# Patient Record
Sex: FEMALE | Race: WHITE | NOT HISPANIC OR LATINO | URBAN - METROPOLITAN AREA
[De-identification: names, ages, dates, MRNs, and addresses within clinical notes are randomized per-mention and may not be internally consistent; named-entity substitution may affect disease eponyms.]

---

## 2019-10-17 ENCOUNTER — APPOINTMENT (RX ONLY)
Dept: URBAN - METROPOLITAN AREA OTHER 9 | Facility: OTHER | Age: 39
Setting detail: DERMATOLOGY
End: 2019-10-17

## 2019-10-17 DIAGNOSIS — L81.4 OTHER MELANIN HYPERPIGMENTATION: ICD-10-CM

## 2019-10-17 DIAGNOSIS — L57.8 OTHER SKIN CHANGES DUE TO CHRONIC EXPOSURE TO NONIONIZING RADIATION: ICD-10-CM

## 2019-10-17 DIAGNOSIS — D22 MELANOCYTIC NEVI: ICD-10-CM

## 2019-10-17 DIAGNOSIS — Q826 OTHER SPECIFIED ANOMALIES OF SKIN: ICD-10-CM

## 2019-10-17 DIAGNOSIS — Q819 OTHER SPECIFIED ANOMALIES OF SKIN: ICD-10-CM

## 2019-10-17 DIAGNOSIS — L98.8 OTHER SPECIFIED DISORDERS OF THE SKIN AND SUBCUTANEOUS TISSUE: ICD-10-CM

## 2019-10-17 DIAGNOSIS — Q828 OTHER SPECIFIED ANOMALIES OF SKIN: ICD-10-CM

## 2019-10-17 PROBLEM — D48.5 NEOPLASM OF UNCERTAIN BEHAVIOR OF SKIN: Status: ACTIVE | Noted: 2019-10-17

## 2019-10-17 PROBLEM — Q82.8 OTHER SPECIFIED CONGENITAL MALFORMATIONS OF SKIN: Status: ACTIVE | Noted: 2019-10-17

## 2019-10-17 PROBLEM — D22.5 MELANOCYTIC NEVI OF TRUNK: Status: ACTIVE | Noted: 2019-10-17

## 2019-10-17 PROCEDURE — ? PRESCRIPTION

## 2019-10-17 PROCEDURE — ? COUNSELING

## 2019-10-17 PROCEDURE — ? OBSERVATION

## 2019-10-17 PROCEDURE — ? OTHER

## 2019-10-17 PROCEDURE — ? TREATMENT REGIMEN

## 2019-10-17 PROCEDURE — 99203 OFFICE O/P NEW LOW 30 MIN: CPT

## 2019-10-17 RX ORDER — TRETINOIN 0.25 MG/G
CREAM TOPICAL
Qty: 1 | Refills: 3 | Status: ERX | COMMUNITY
Start: 2019-10-17

## 2019-10-17 RX ORDER — TRIAMCINOLONE ACETONIDE 1 MG/G
CREAM TOPICAL BID X 2 WEEKS
Qty: 1 | Refills: 1 | Status: ERX | COMMUNITY
Start: 2019-10-17

## 2019-10-17 RX ADMIN — TRIAMCINOLONE ACETONIDE: 1 CREAM TOPICAL at 00:00

## 2019-10-17 RX ADMIN — TRETINOIN: 0.25 CREAM TOPICAL at 00:00

## 2019-10-17 ASSESSMENT — LOCATION ZONE DERM
LOCATION ZONE: LEG
LOCATION ZONE: TRUNK
LOCATION ZONE: SCALP
LOCATION ZONE: FACE
LOCATION ZONE: ARM

## 2019-10-17 ASSESSMENT — LOCATION DETAILED DESCRIPTION DERM
LOCATION DETAILED: SUPERIOR THORACIC SPINE
LOCATION DETAILED: INFERIOR MID FOREHEAD
LOCATION DETAILED: LEFT SUPERIOR PARIETAL SCALP
LOCATION DETAILED: LEFT SUPRAPUBIC SKIN
LOCATION DETAILED: RIGHT ANTERIOR PROXIMAL THIGH
LOCATION DETAILED: RIGHT PROXIMAL POSTERIOR UPPER ARM
LOCATION DETAILED: UPPER STERNUM
LOCATION DETAILED: RIGHT LATERAL KNEE
LOCATION DETAILED: LEFT MID-UPPER BACK

## 2019-10-17 ASSESSMENT — LOCATION SIMPLE DESCRIPTION DERM
LOCATION SIMPLE: LEFT UPPER BACK
LOCATION SIMPLE: INFERIOR FOREHEAD
LOCATION SIMPLE: GROIN
LOCATION SIMPLE: SCALP
LOCATION SIMPLE: RIGHT THIGH
LOCATION SIMPLE: UPPER BACK
LOCATION SIMPLE: RIGHT KNEE
LOCATION SIMPLE: CHEST
LOCATION SIMPLE: RIGHT POSTERIOR UPPER ARM

## 2019-10-17 NOTE — PROCEDURE: OBSERVATION
Size Of Lesion In Cm (Optional): 0
Body Location Override (Optional - Billing Will Still Be Based On Selected Body Map Location If Applicable): left upper back
Detail Level: Zone

## 2019-10-17 NOTE — PROCEDURE: OTHER
Other (Free Text): Biopsy Shave Method Location (A): left parietal scalp. Written consent was obtained and risks were reviewed including but not limited to scarring, infection, bleeding, scabbing, incomplete removal, nerve damage and allergy to anesthesia.  The area was prepped with Alcohol. Local anesthesia was obtained with approximately 3cc of 1% lidocaine with 1:200,000 epinephrine.  A biopsy by shave method to the level of the dermis (sent for H and E) was performed using a Personna blade on the left parietal scalp. Aluminum Chloride and Thermocautery was used for hemostasis. Following the biopsy Polysporin ointment and a bandage were applied. Patient will be notified of biopsy results. However, patient instructed to call the office if not contacted within 2 weeks. I reviewed with the patient in detail post-care instructions. Patient is to keep the biopsy site dry overnight, and then apply bacitracin twice daily until healed. Patient may apply hydrogen peroxide soaks to remove any crusting.
Note Text (......Xxx Chief Complaint.): This diagnosis correlates with the
Detail Level: Detailed

## 2019-12-09 ENCOUNTER — APPOINTMENT (RX ONLY)
Dept: URBAN - METROPOLITAN AREA OTHER 9 | Facility: OTHER | Age: 39
Setting detail: DERMATOLOGY
End: 2019-12-09

## 2019-12-09 ENCOUNTER — RX ONLY (OUTPATIENT)
Age: 39
Setting detail: RX ONLY
End: 2019-12-09

## 2019-12-09 DIAGNOSIS — L57.8 OTHER SKIN CHANGES DUE TO CHRONIC EXPOSURE TO NONIONIZING RADIATION: ICD-10-CM | Status: IMPROVED

## 2019-12-09 DIAGNOSIS — Q819 OTHER SPECIFIED ANOMALIES OF SKIN: ICD-10-CM | Status: IMPROVED

## 2019-12-09 DIAGNOSIS — Q828 OTHER SPECIFIED ANOMALIES OF SKIN: ICD-10-CM | Status: IMPROVED

## 2019-12-09 DIAGNOSIS — B35.6 TINEA CRURIS: ICD-10-CM

## 2019-12-09 DIAGNOSIS — Q826 OTHER SPECIFIED ANOMALIES OF SKIN: ICD-10-CM | Status: IMPROVED

## 2019-12-09 PROBLEM — Q82.8 OTHER SPECIFIED CONGENITAL MALFORMATIONS OF SKIN: Status: ACTIVE | Noted: 2019-12-09

## 2019-12-09 PROCEDURE — ? PRESCRIPTION

## 2019-12-09 PROCEDURE — ? OTHER

## 2019-12-09 PROCEDURE — ? TREATMENT REGIMEN

## 2019-12-09 PROCEDURE — ? COUNSELING

## 2019-12-09 PROCEDURE — 99213 OFFICE O/P EST LOW 20 MIN: CPT

## 2019-12-09 RX ORDER — ECONAZOLE NITRATE 10 MG/G
CREAM TOPICAL BID
Qty: 1 | Refills: 1 | Status: ERX | COMMUNITY
Start: 2019-12-09

## 2019-12-09 RX ORDER — TRIAMCINOLONE ACETONIDE 1 MG/G
CREAM TOPICAL BID X 2 WEEKS
Qty: 1 | Refills: 1 | Status: ERX

## 2019-12-09 RX ADMIN — ECONAZOLE NITRATE: 10 CREAM TOPICAL at 00:00

## 2019-12-09 ASSESSMENT — LOCATION ZONE DERM
LOCATION ZONE: FACE
LOCATION ZONE: VULVA
LOCATION ZONE: ARM
LOCATION ZONE: LEG

## 2019-12-09 ASSESSMENT — LOCATION DETAILED DESCRIPTION DERM
LOCATION DETAILED: RIGHT PROXIMAL POSTERIOR UPPER ARM
LOCATION DETAILED: RIGHT LATERAL KNEE
LOCATION DETAILED: MONS PUBIS
LOCATION DETAILED: INFERIOR MID FOREHEAD

## 2019-12-09 ASSESSMENT — LOCATION SIMPLE DESCRIPTION DERM
LOCATION SIMPLE: INFERIOR FOREHEAD
LOCATION SIMPLE: RIGHT KNEE
LOCATION SIMPLE: GROIN
LOCATION SIMPLE: RIGHT POSTERIOR UPPER ARM

## 2019-12-09 ASSESSMENT — SEVERITY ASSESSMENT
SEVERITY: ALMOST CLEAR
SEVERITY: MILD

## 2019-12-09 NOTE — PROCEDURE: TREATMENT REGIMEN
Continue Regimen: Retin-A/tretinoin
Detail Level: Zone
Plan for chemical peel during the winter
Detail Level: Simple
Continue Regimen: Triamcinolone (refilled)

## 2019-12-09 NOTE — PROCEDURE: OTHER
Other (Free Text): Dalton’s and sal acid chemical peel at today’s visit
Detail Level: Simple
Note Text (......Xxx Chief Complaint.): This diagnosis correlates with the

## 2022-07-07 ENCOUNTER — APPOINTMENT (RX ONLY)
Dept: URBAN - METROPOLITAN AREA OTHER 9 | Facility: OTHER | Age: 42
Setting detail: DERMATOLOGY
End: 2022-07-07

## 2022-07-07 DIAGNOSIS — D22 MELANOCYTIC NEVI: ICD-10-CM

## 2022-07-07 DIAGNOSIS — L57.8 OTHER SKIN CHANGES DUE TO CHRONIC EXPOSURE TO NONIONIZING RADIATION: ICD-10-CM

## 2022-07-07 PROBLEM — D48.5 NEOPLASM OF UNCERTAIN BEHAVIOR OF SKIN: Status: ACTIVE | Noted: 2022-07-07

## 2022-07-07 PROCEDURE — ? COUNSELING

## 2022-07-07 PROCEDURE — ? PRESCRIPTION

## 2022-07-07 PROCEDURE — ? PATIENT SPECIFIC COUNSELING

## 2022-07-07 PROCEDURE — ? OTHER

## 2022-07-07 PROCEDURE — ? BIOPSY BY SHAVE METHOD

## 2022-07-07 PROCEDURE — 99212 OFFICE O/P EST SF 10 MIN: CPT | Mod: 25

## 2022-07-07 PROCEDURE — ? PRESCRIPTION MEDICATION MANAGEMENT

## 2022-07-07 RX ORDER — TRETIONIN 0.25 MG/G
CREAM TOPICAL QHS
Qty: 45 | Refills: 3 | Status: ERX

## 2022-07-07 ASSESSMENT — LOCATION SIMPLE DESCRIPTION DERM
LOCATION SIMPLE: ABDOMEN
LOCATION SIMPLE: INFERIOR FOREHEAD
LOCATION SIMPLE: LEFT UPPER BACK

## 2022-07-07 ASSESSMENT — LOCATION DETAILED DESCRIPTION DERM
LOCATION DETAILED: INFERIOR MID FOREHEAD
LOCATION DETAILED: RIGHT RIB CAGE
LOCATION DETAILED: LEFT MID-UPPER BACK

## 2022-07-07 ASSESSMENT — LOCATION ZONE DERM
LOCATION ZONE: FACE
LOCATION ZONE: TRUNK

## 2022-07-07 NOTE — PROCEDURE: PATIENT SPECIFIC COUNSELING
Advised patient to limit exercise for a few days following peel, as well as avoiding sun exposure for 2 weeks; and to wear sunscreen. Also informed the patient to avoid picking at face. Recommended Revisions vitamin C serum
Detail Level: Detailed

## 2022-07-07 NOTE — PROCEDURE: PRESCRIPTION MEDICATION MANAGEMENT
Continue Regimen: tretinoin 0.025 % topical cream QHS: Apply pea-size amount to affected areas every other night. Avoid eyes and lips.
Render In Strict Bullet Format?: No
Detail Level: Zone

## 2022-07-07 NOTE — PROCEDURE: OTHER
Detail Level: Detailed
Other (Free Text): A chemical peel with Jessner was performed on the body. Prior to the procedure, written consent was obtained and risks were reviewed, including but not limited to: redness, peeling, blistering, pigmentary change, scarring, infection, and pain.  The treated area was degreased with pre-peel cleanser, and vaseline was applied for protection of mucous membranes.\\nAfter the procedure, a post-peel cream was applied to the treated areas. Sun protection and post-care instructions were reviewed with the patient..
Note Text (......Xxx Chief Complaint.): This diagnosis correlates with the
Render Risk Assessment In Note?: no

## 2022-07-07 NOTE — PROCEDURE: BIOPSY BY SHAVE METHOD
Detail Level: Detailed
Depth Of Biopsy: dermis
Was A Bandage Applied: Yes
Size Of Lesion In Cm: 0.5
Biopsy Type: H and E
Biopsy Method: Personna blade
Anesthesia Type: 1% lidocaine with 1:200,000 epinephrine
Anesthesia Volume In Cc: 3
Additional Anesthesia Volume In Cc (Will Not Render If 0): 0
Hemostasis: Aluminum Chloride and Thermocautery
Wound Care: Polysporin ointment
Dressing: bandage
Destruction After The Procedure: No
Type Of Destruction Used: Curettage
Curettage Text: The wound bed was treated with curettage after the biopsy was performed
Cryotherapy Text: The wound bed was treated with cryotherapy after the biopsy was performed.
Electrodesiccation Text: The wound bed was treated with electrodesiccation after the biopsy was performed.
Electrodesiccation And Curettage Text: The wound bed was treated with electrodesiccation and curettage after the biopsy was performed.
Silver Nitrate Text: The wound bed was treated with silver nitrate after the biopsy was performed.
Lab: [id number]
Lab Facility: [id number]
Consent: Written consent was obtained and risks were reviewed including but not limited to scarring, infection, bleeding, scabbing, incomplete removal, nerve damage and allergy to anesthesia.
Post-Care Instructions: I reviewed with the patient in detail post-care instructions. Patient is to keep the biopsy site dry overnight, and then apply bacitracin twice daily until healed. Patient may apply hydrogen peroxide soaks to remove any crusting.
Notification Instructions: Patient will be notified of biopsy results. However, patient instructed to call the office if not contacted within 2 weeks.
Billing Type: Third-Party Bill
Information: Selecting Yes will display possible errors in your note based on the variables you have selected. This validation is only offered as a suggestion for you. PLEASE NOTE THAT THE VALIDATION TEXT WILL BE REMOVED WHEN YOU FINALIZE YOUR NOTE. IF YOU WANT TO FAX A PRELIMINARY NOTE YOU WILL NEED TO TOGGLE THIS TO 'NO' IF YOU DO NOT WANT IT IN YOUR FAXED NOTE.

## 2022-07-07 NOTE — HPI: COSMETIC (CHEMICAL PEEL)
Have You Had A Chemical Peel Before?: has not had a previous peel
Additional History: Patient is also present for removal of IDN on back and abdomen.

## 2023-02-02 ENCOUNTER — APPOINTMENT (RX ONLY)
Dept: URBAN - METROPOLITAN AREA CLINIC 12 | Facility: CLINIC | Age: 43
Setting detail: DERMATOLOGY
End: 2023-02-02

## 2023-02-02 DIAGNOSIS — Z41.9 ENCOUNTER FOR PROCEDURE FOR PURPOSES OTHER THAN REMEDYING HEALTH STATE, UNSPECIFIED: ICD-10-CM

## 2023-02-02 PROCEDURE — ? ADDITIONAL NOTES

## 2023-02-02 PROCEDURE — ? OTHER (COSMETIC)

## 2023-02-02 PROCEDURE — ? CONSULTATION - AGING FACE

## 2023-02-02 NOTE — PROCEDURE: OTHER (COSMETIC)
Sticky Other (Free Text): Juvederm Volux XC 1cc\\n2 syringes used\\nCheeks: 1cc \\nJawline: 0.6cc\\nChin: 0.4cc\\nLot: [id number]\\nExp: 8/24\\nBarcode: [id number] x2
Price $ (Use Numbers Only, No Text Please.): 3760
Detail Level: Zone

## 2023-02-02 NOTE — PROCEDURE: ADDITIONAL NOTES
Render Risk Assessment In Note?: no
Additional Notes: Recommended Alastin Post injection cream inhance - no charge per Yarelis cevallos

## 2023-02-09 ENCOUNTER — APPOINTMENT (RX ONLY)
Dept: URBAN - METROPOLITAN AREA CLINIC 12 | Facility: CLINIC | Age: 43
Setting detail: DERMATOLOGY
End: 2023-02-09

## 2023-02-09 DIAGNOSIS — Z428 OTHER AFTERCARE INVOLVING THE USE OF PLASTIC SURGERY: ICD-10-CM

## 2023-02-09 PROCEDURE — ? CONSULTATION - FILLERS

## 2023-02-09 PROCEDURE — ? PHOTOS OBTAINED

## 2023-02-09 PROCEDURE — ? ADDITIONAL NOTES

## 2023-02-09 NOTE — PROCEDURE: PHOTOS OBTAINED
Photographed Locations: Photos were obtained of the patient.
Follow-Up For Additional Photos?: no
Detail Level: Detailed

## 2023-02-09 NOTE — PROCEDURE: CONSULTATION - FILLERS
Decollete Secondary Filler Volume In Cc (Estimated): 0
Additional Area 1 Location: chin
Procedure Quote $ (Will Render In Note. Use Numbers Only, No Text Please.): 950
Additional Area 5 Location: neck
Temples Filler (Primary): Radiesse+
Send Procedure Quote As Charge: No
Detail Level: Detailed
Temples Primary Filler Volume In Cc (Estimated): 1.5
Additional Area 4 Location: nose
Additional Area 3 Location: perioral lines
Additional Area 2 Location: chin shadow

## 2023-02-09 NOTE — PROCEDURE: ADDITIONAL NOTES
Additional Notes: Informed patient it is too early for true follow up, advised it will continue to settle over the next 2-3 weeks.
Render Risk Assessment In Note?: no
Additional Notes: Discussed kybella for jowls and buccal fat area.

## 2023-03-01 ENCOUNTER — APPOINTMENT (RX ONLY)
Dept: URBAN - METROPOLITAN AREA CLINIC 12 | Facility: CLINIC | Age: 43
Setting detail: DERMATOLOGY
End: 2023-03-01

## 2023-03-01 DIAGNOSIS — Z428 OTHER AFTERCARE INVOLVING THE USE OF PLASTIC SURGERY: ICD-10-CM

## 2023-03-01 PROCEDURE — ? CODE 99024 - NO CHARGE POST-OP VISIT

## 2023-03-01 PROCEDURE — 99024 POSTOP FOLLOW-UP VISIT: CPT

## 2023-03-01 PROCEDURE — ? CONSULTATION - FILLERS

## 2023-03-01 PROCEDURE — ? PHOTOS OBTAINED

## 2023-03-01 NOTE — PROCEDURE: REASSURANCE
Detail Level: Detailed
Information Provided: Reassurance was provided to the patient that the clinical findings are within expected normal limits and do not require specific further evaluation or treatment at this time.
Follow-Up Increment: 3
Follow-Up Interval: months
Follow-Up: no

## 2023-03-01 NOTE — PROCEDURE: PHOTOS OBTAINED
Follow-Up For Additional Photos?: no
Detail Level: Detailed
Photographed Locations: Photos were obtained of the patient.

## 2023-03-01 NOTE — PROCEDURE: CONSULTATION - FILLERS
Additional Area 3 Location: perioral lines
Brows Filler Secondary Volume In Cc (Estimated): 0
Procedure Quote $ (Will Render In Note. Use Numbers Only, No Text Please.): 1800
Send Procedure Quote As Charge: No
Additional Area 2 Location: chin shadow
Detail Level: Detailed
Temples Filler (Primary): Radiesse+
Additional Area 1 Location: chin
Additional Area 5 Location: neck
Additional Area 4 Location: nose
Temples Primary Filler Volume In Cc (Estimated): 3

## 2023-06-07 ENCOUNTER — APPOINTMENT (RX ONLY)
Dept: URBAN - METROPOLITAN AREA CLINIC 12 | Facility: CLINIC | Age: 43
Setting detail: DERMATOLOGY
End: 2023-06-07

## 2023-06-07 DIAGNOSIS — Z41.9 ENCOUNTER FOR PROCEDURE FOR PURPOSES OTHER THAN REMEDYING HEALTH STATE, UNSPECIFIED: ICD-10-CM

## 2023-06-07 DIAGNOSIS — Z428 OTHER AFTERCARE INVOLVING THE USE OF PLASTIC SURGERY: ICD-10-CM

## 2023-06-07 PROCEDURE — ? CODE 99024 - NO CHARGE POST-OP VISIT

## 2023-06-07 PROCEDURE — 99024 POSTOP FOLLOW-UP VISIT: CPT

## 2023-06-07 ASSESSMENT — LOCATION DETAILED DESCRIPTION DERM: LOCATION DETAILED: LEFT CENTRAL MALAR CHEEK

## 2023-06-07 ASSESSMENT — LOCATION ZONE DERM: LOCATION ZONE: FACE

## 2023-06-07 ASSESSMENT — LOCATION SIMPLE DESCRIPTION DERM: LOCATION SIMPLE: LEFT CHEEK

## 2023-06-07 NOTE — PROCEDURE: REASSURANCE
Detail Level: Detailed
Follow-Up Interval: months
Follow-Up: no
Follow-Up Increment: 3
Information Provided: Reassurance was provided to the patient that the clinical findings are within expected normal limits and do not require specific further evaluation or treatment at this time.

## 2023-08-16 ENCOUNTER — APPOINTMENT (RX ONLY)
Dept: URBAN - METROPOLITAN AREA OTHER 9 | Facility: OTHER | Age: 43
Setting detail: DERMATOLOGY
End: 2023-08-16

## 2023-08-16 DIAGNOSIS — Q819 OTHER SPECIFIED ANOMALIES OF SKIN: ICD-10-CM

## 2023-08-16 DIAGNOSIS — L57.8 OTHER SKIN CHANGES DUE TO CHRONIC EXPOSURE TO NONIONIZING RADIATION: ICD-10-CM

## 2023-08-16 DIAGNOSIS — D22 MELANOCYTIC NEVI: ICD-10-CM

## 2023-08-16 DIAGNOSIS — L81.4 OTHER MELANIN HYPERPIGMENTATION: ICD-10-CM

## 2023-08-16 DIAGNOSIS — Q826 OTHER SPECIFIED ANOMALIES OF SKIN: ICD-10-CM

## 2023-08-16 DIAGNOSIS — Q828 OTHER SPECIFIED ANOMALIES OF SKIN: ICD-10-CM

## 2023-08-16 PROBLEM — D22.5 MELANOCYTIC NEVI OF TRUNK: Status: ACTIVE | Noted: 2023-08-16

## 2023-08-16 PROBLEM — L85.8 OTHER SPECIFIED EPIDERMAL THICKENING: Status: ACTIVE | Noted: 2023-08-16

## 2023-08-16 PROCEDURE — ? TREATMENT REGIMEN

## 2023-08-16 PROCEDURE — ? COUNSELING

## 2023-08-16 PROCEDURE — ? PRESCRIPTION MEDICATION MANAGEMENT

## 2023-08-16 PROCEDURE — 99213 OFFICE O/P EST LOW 20 MIN: CPT

## 2023-08-16 ASSESSMENT — LOCATION ZONE DERM
LOCATION ZONE: LEG
LOCATION ZONE: FACE
LOCATION ZONE: TRUNK

## 2023-08-16 ASSESSMENT — LOCATION DETAILED DESCRIPTION DERM
LOCATION DETAILED: LEFT SUPERIOR UPPER BACK
LOCATION DETAILED: RIGHT PROXIMAL PRETIBIAL REGION
LOCATION DETAILED: LEFT CENTRAL MALAR CHEEK

## 2023-08-16 ASSESSMENT — LOCATION SIMPLE DESCRIPTION DERM
LOCATION SIMPLE: LEFT UPPER BACK
LOCATION SIMPLE: LEFT CHEEK
LOCATION SIMPLE: RIGHT PRETIBIAL REGION

## 2023-08-16 NOTE — PROCEDURE: PRESCRIPTION MEDICATION MANAGEMENT
Continue Regimen: Continue using tretinoin 3x a week or everyday
Detail Level: Detailed
Render In Strict Bullet Format?: No

## 2023-08-16 NOTE — PROCEDURE: TREATMENT REGIMEN
Plan: Recommend exfoliation, laser hair removal or treatment of KP on legs
Detail Level: Zone
Otc Regimen: Glytone

## 2024-07-20 ENCOUNTER — OFFICE VISIT (OUTPATIENT)
Dept: URGENT CARE | Facility: CLINIC | Age: 44
End: 2024-07-20
Payer: COMMERCIAL

## 2024-07-20 VITALS
SYSTOLIC BLOOD PRESSURE: 104 MMHG | WEIGHT: 120 LBS | HEART RATE: 77 BPM | OXYGEN SATURATION: 98 % | RESPIRATION RATE: 15 BRPM | DIASTOLIC BLOOD PRESSURE: 73 MMHG | TEMPERATURE: 98 F | HEIGHT: 65 IN | BODY MASS INDEX: 19.99 KG/M2

## 2024-07-20 DIAGNOSIS — S61.439A: ICD-10-CM

## 2024-07-20 DIAGNOSIS — Z23 NEED FOR TDAP VACCINATION: ICD-10-CM

## 2024-07-20 DIAGNOSIS — M79.641 RIGHT HAND PAIN: ICD-10-CM

## 2024-07-20 DIAGNOSIS — S61.431A PUNCTURE WOUND OF RIGHT HAND WITHOUT FOREIGN BODY, INITIAL ENCOUNTER: Primary | ICD-10-CM
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
## 2024-07-20 PROBLEM — L90.8 SKIN AGING: Status: ACTIVE | Noted: 2024-07-20

## 2024-07-20 PROCEDURE — 90715 TDAP VACCINE 7 YRS/> IM: CPT | Mod: S$GLB,,, | Performed by: NURSE PRACTITIONER

## 2024-07-20 PROCEDURE — 90471 IMMUNIZATION ADMIN: CPT | Mod: S$GLB,,, | Performed by: NURSE PRACTITIONER

## 2024-07-20 PROCEDURE — 99203 OFFICE O/P NEW LOW 30 MIN: CPT | Mod: 25,S$GLB,, | Performed by: NURSE PRACTITIONER

## 2024-07-20 RX ORDER — CEPHALEXIN 500 MG/1
500 CAPSULE ORAL EVERY 6 HOURS
Qty: 20 CAPSULE | Refills: 0 | Status: SHIPPED | OUTPATIENT
Start: 2024-07-20 | End: 2024-07-25

## 2024-07-20 RX ORDER — MUPIROCIN 20 MG/G
OINTMENT TOPICAL 3 TIMES DAILY PRN
Qty: 22 G | Refills: 0 | Status: SHIPPED | OUTPATIENT
Start: 2024-07-20

## 2024-07-20 RX ORDER — ACETAMINOPHEN 500 MG
500 TABLET ORAL
Status: COMPLETED | OUTPATIENT
Start: 2024-07-20 | End: 2024-07-20

## 2024-07-20 RX ADMIN — Medication 500 MG: at 01:07

## 2024-07-20 NOTE — PATIENT INSTRUCTIONS
Please drink plenty of fluids.  Please get plenty of rest.  Please return here or go to the Emergency Department for any concerns or worsening of condition.  If you were given wait & see antibiotics, please wait  before taking them, and only take them if your symptoms have worsened or not improved.  If you do begin taking the antibiotics, please take them to completion.  If not allergic, please take over the counter Tylenol (Acetaminophen) and/or Motrin (Ibuprofen) as directed for control of pain and/or fever.  Please follow up with your primary care doctor or specialist as needed.    If you  smoke, please stop smoking.

## 2024-07-20 NOTE — PROGRESS NOTES
"Subjective:      Patient ID: Esdras Solorio is a 43 y.o. female.    Vitals:  height is 5' 5" (1.651 m) and weight is 54.4 kg (120 lb). Her oral temperature is 98.3 °F (36.8 °C). Her blood pressure is 104/73 and her pulse is 77. Her respiration is 15 and oxygen saturation is 98%.     Chief Complaint: Puncture wound    Patient presents with c.o puncture wound to the rt hand that occurred appx 1 hour pta. Patient states she was moving an old couch and a nailed punctured the hand. Wound cleaned with soap and water plus vodka. Last tetanus unknown. Patient also reports pain that radiates up her forearm.       43 year old female presents to clinic with complaints of puncture injury with nail to palmar aspect of left hand. Area cleaned with vodka, soap and water. Reports tingling sensation going up her arm. Does not recall last tetanus vaccine    Other  This is a new problem. Episode onset: 1 hour pta. Episode frequency: once. Pertinent negatives include no abdominal pain, anorexia or numbness. Nothing aggravates the symptoms. She has tried nothing for the symptoms.     Gastrointestinal:  Negative for abdominal pain.   Skin:  Positive for puncture wound. Negative for color change and erythema.   Neurological:  Positive for tingling. Negative for numbness.      Objective:     Physical Exam   Constitutional: She is oriented to person, place, and time. She appears well-developed.   HENT:   Head: Normocephalic and atraumatic. Head is without abrasion, without contusion and without laceration.   Ears:   Right Ear: External ear normal.   Left Ear: External ear normal.   Nose: Nose normal.   Mouth/Throat: Oropharynx is clear and moist and mucous membranes are normal.   Eyes: EOM and lids are normal. Extraocular movement intact   Neck: Trachea normal and phonation normal. Neck supple.   Cardiovascular: Normal rate.   Pulmonary/Chest: Effort normal. No stridor. No respiratory distress.   Musculoskeletal: Normal range of motion.       "   General: Normal range of motion.      Right hand: She exhibits tenderness. She exhibits normal range of motion, no bony tenderness, normal capillary refill, no deformity, no laceration and no swelling. Normal sensation noted. Normal strength noted.   Neurological: She is alert and oriented to person, place, and time.   Skin: Skin is warm, dry, intact and no rash. Capillary refill takes less than 2 seconds. No abrasion, No burn, No bruising, No erythema and No ecchymosis   Psychiatric: Her speech is normal and behavior is normal. Judgment and thought content normal.   Nursing note and vitals reviewed.    Puncture wound left lateral aspect of right hand  Assessment:     1. Puncture wound of right hand without foreign body, initial encounter    2. Nail wound of palm of hand    3. Right hand pain    4. Need for Tdap vaccination        Plan:       Puncture wound of right hand without foreign body, initial encounter  -     Tdap (BOOSTRIX) vaccine injection 0.5 mL  -     mupirocin (BACTROBAN) 2 % ointment; Apply topically 3 (three) times daily as needed.  Dispense: 22 g; Refill: 0  -     cephALEXin (KEFLEX) 500 MG capsule; Take 1 capsule (500 mg total) by mouth every 6 (six) hours. for 5 days  Dispense: 20 capsule; Refill: 0    Nail wound of palm of hand  -     Tdap (BOOSTRIX) vaccine injection 0.5 mL  -     mupirocin (BACTROBAN) 2 % ointment; Apply topically 3 (three) times daily as needed.  Dispense: 22 g; Refill: 0  -     cephALEXin (KEFLEX) 500 MG capsule; Take 1 capsule (500 mg total) by mouth every 6 (six) hours. for 5 days  Dispense: 20 capsule; Refill: 0    Right hand pain    Need for Tdap vaccination  -     Tdap (BOOSTRIX) vaccine injection 0.5 mL      Patient Instructions   Please drink plenty of fluids.  Please get plenty of rest.  Please return here or go to the Emergency Department for any concerns or worsening of condition.  If you were given wait & see antibiotics, please wait  before taking them, and only  take them if your symptoms have worsened or not improved.  If you do begin taking the antibiotics, please take them to completion.  If not allergic, please take over the counter Tylenol (Acetaminophen) and/or Motrin (Ibuprofen) as directed for control of pain and/or fever.  Please follow up with your primary care doctor or specialist as needed.    If you  smoke, please stop smoking.

## 2024-11-13 ENCOUNTER — OFFICE VISIT (OUTPATIENT)
Dept: URGENT CARE | Facility: CLINIC | Age: 44
End: 2024-11-13
Payer: COMMERCIAL

## 2024-11-13 VITALS
RESPIRATION RATE: 19 BRPM | SYSTOLIC BLOOD PRESSURE: 114 MMHG | BODY MASS INDEX: 20.83 KG/M2 | WEIGHT: 125 LBS | OXYGEN SATURATION: 99 % | HEART RATE: 98 BPM | DIASTOLIC BLOOD PRESSURE: 77 MMHG | HEIGHT: 65 IN | TEMPERATURE: 98 F

## 2024-11-13 DIAGNOSIS — S16.1XXA STRAIN OF NECK MUSCLE, INITIAL ENCOUNTER: ICD-10-CM

## 2024-11-13 DIAGNOSIS — S39.012A STRAIN OF MUSCLE, FASCIA AND TENDON OF LOWER BACK, INITIAL ENCOUNTER: ICD-10-CM
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
## 2024-11-13 PROCEDURE — 72040 X-RAY EXAM NECK SPINE 2-3 VW: CPT | Mod: S$GLB,,, | Performed by: RADIOLOGY

## 2024-11-13 PROCEDURE — 99214 OFFICE O/P EST MOD 30 MIN: CPT | Mod: S$GLB,,, | Performed by: FAMILY MEDICINE

## 2024-11-13 RX ORDER — METHOCARBAMOL 500 MG/1
500 TABLET, FILM COATED ORAL 3 TIMES DAILY
Qty: 20 TABLET | Refills: 0 | Status: SHIPPED | OUTPATIENT
Start: 2024-11-13 | End: 2024-11-20
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
## 2025-01-10 ENCOUNTER — PATIENT MESSAGE (OUTPATIENT)
Dept: ORTHOPEDICS | Facility: CLINIC | Age: 45
End: 2025-01-10

## 2025-01-10 ENCOUNTER — HOSPITAL ENCOUNTER (OUTPATIENT)
Dept: RADIOLOGY | Facility: HOSPITAL | Age: 45
Discharge: HOME | End: 2025-01-10
Attending: ORTHOPAEDIC SURGERY
Payer: COMMERCIAL

## 2025-01-10 ENCOUNTER — OFFICE VISIT (OUTPATIENT)
Dept: ORTHOPEDICS | Facility: CLINIC | Age: 45
End: 2025-01-10
Payer: COMMERCIAL

## 2025-01-10 VITALS — WEIGHT: 125 LBS | HEIGHT: 65 IN | BODY MASS INDEX: 20.83 KG/M2

## 2025-01-10 DIAGNOSIS — M76.61 ACHILLES TENDINITIS OF BOTH LOWER EXTREMITIES: ICD-10-CM

## 2025-01-10 DIAGNOSIS — T85.848A PAIN FROM IMPLANTED HARDWARE, INITIAL ENCOUNTER: ICD-10-CM

## 2025-01-10 DIAGNOSIS — M76.62 ACHILLES TENDINITIS OF BOTH LOWER EXTREMITIES: ICD-10-CM

## 2025-01-10 DIAGNOSIS — R52 PAIN: ICD-10-CM

## 2025-01-10 PROCEDURE — 73630 X-RAY EXAM OF FOOT: CPT | Mod: TC,50

## 2025-01-10 PROCEDURE — 99999 PR PBB SHADOW E&M-EST. PATIENT-LVL III: CPT | Mod: PBBFAC,,, | Performed by: ORTHOPAEDIC SURGERY

## 2025-01-10 PROCEDURE — 73630 X-RAY EXAM OF FOOT: CPT | Mod: 26,50,, | Performed by: RADIOLOGY

## 2025-01-10 RX ORDER — METHYLPREDNISOLONE 4 MG/1
TABLET ORAL
Qty: 21 EACH | Refills: 0 | Status: SHIPPED | OUTPATIENT
Start: 2025-01-10 | End: 2025-01-31

## 2025-01-10 NOTE — PROGRESS NOTES
"[facility]    DATE: 1/10/2025  PATIENT: Esdras Solorio    Chief Complaint: bilateral painful foot hardware and heel pain    HPI:  Esdras Solorio is a 44 y.o. female with a history of bilateral bunionectomies (chevron osteotomies, 2003), who presents for evaluation of painful hardware that began over a year ago.  Patient has reportedly been experiencing irritation at her prior surgical sites along the dorsum of bilateral 1st MTP joints and has noticed screws becoming more prominent during this time.  She also has been having bilateral posterior heel pain (R>L) that is worse with activity and only alleviated with rest.  She has custom orthotic inserts that she wears daily; has not tried OTC NSAIDs, corticosteroids, or physical therapy.  Denies any associated numbness/tingling; also denies any inciting trauma.  Able to ambulate around 1.5 miles unassisted before needing to rest due to pain.  She presents today for evaluation and potential hardware removal.             Review of patient's allergies indicates:  No Known Allergies    History reviewed. No pertinent past medical history.  Past Surgical History:   Procedure Laterality Date    HYSTERECTOMY       No family history on file.  Social History     Tobacco Use    Smoking status: Never    Smokeless tobacco: Never   Substance Use Topics    Alcohol use: Yes     Comment: wine        Review of Systems:  Constitution: Negative for chills, fever  HENT: Negative for congestion  Cardiovascular: Negative for chest pain, palpitations  Respiratory: Negative for cough, shortness of breath  Hematologic/Lymphatic: Negative for easy bruising/bleeding  Skin: Negative for rash, suspicious lesions  Gastrointestinal: Negative for nausea, vomiting, bowel incontinence  Genitourinary: Negative for bladder incontinence  Neurological: Negative for numbness, paresthesias, sensory change  Musculoskeletal: see HPI         Vital Signs:  Ht 5' 5" (1.651 m)   Wt 56.7 kg " (125 lb)   BMI 20.80 kg/m²     Physical Exam:  General:  NAD, WDWN, Mood is pleasant  HENT:  NCAT, Bilateral ears/eyes normal  CV:  Normal pulses, color, and cap refill  Lungs:  Normal respiratory effort  Psych:  Alert and oriented x 3    Bilateral Foot and Ankle Exam:     Inspection: Surgical scars present over the distal aspect of 1st metatarsal   Mild swelling present at prior incision sites  Standing examination demonstrates Normal hindfoot/forefoot alignment.   Medial longitudinal arch is neutral. .    Palpation: Screw heads are palpable bilaterally with associated tenderness  Moderately TTP along the medial achilles insertion bilaterally   No palpable achilles defect, negative Harman test bilaterally   Hindfoot is flexible.   Ankle is stable to anterior drawer and inversion stress exam.   ROM: Ankle ROM is normal; df15 deg, pf35 deg.   Neurovascular: Fires TA/GSC/PTT/peroneals without guarded range of motion.  SILT SP/DP/PT and able to localize..   Palpable DP and PT pulses.        XRAYS:  Standing 3v bilateral foot demonstrate:  - Evidence of well-healed chevron osteotomies with single screw fixation bilaterally; no evidence of hardware failure  - No evidence of any fracture or dislocation     All other pertinent labs and imaging were reviewed.      ASSESSMENT:   1. Achilles tendinitis of both lower extremities, insertion    2. Pain from implanted hardware, initial encounter, both feet         Esdras Solorio is a 44 y.o. old female with history of bilateral chevron osteotomies in 2003, who presents with bilateral painful hardware and insertional achilles tendinitis       PLAN:     For her insertional achilles tendinitis, a medrol dosepak was ordered and physical therapy referral placed   Advised patient on appropriate OTC NSAID and Tylenol use for pain as needed   Will plan for bilateral hardware removal some time after February 1st       The working diagnosis and available treatment options, both  conservative and surgical, were discussed in detail with the patient today.  Conservative treatment options would include things such as activity modifications, a period of rest, tylenol, anti-inflammatory medications, physical therapy, immobilization, corticosteroid injections, and others.  All questions were answered to the patient's satisfaction.     I have personally taken the history and examined this patient and agree with the residents note as stated above.

## 2025-01-17 ENCOUNTER — CLINICAL SUPPORT (OUTPATIENT)
Dept: REHABILITATION | Facility: OTHER | Age: 45
End: 2025-01-17
Payer: COMMERCIAL

## 2025-01-17 DIAGNOSIS — R29.898 ANKLE WEAKNESS: Primary | ICD-10-CM

## 2025-01-17 DIAGNOSIS — M76.61 ACHILLES TENDINITIS OF BOTH LOWER EXTREMITIES: ICD-10-CM

## 2025-01-17 DIAGNOSIS — M76.62 ACHILLES TENDINITIS OF BOTH LOWER EXTREMITIES: ICD-10-CM

## 2025-01-17 PROCEDURE — 97161 PT EVAL LOW COMPLEX 20 MIN: CPT | Mod: PN

## 2025-01-17 NOTE — PROGRESS NOTES
"  [facility]  Physical Therapy Initial Evaluation    Name: Esdras Solorio  Clinic Number: [id number]    Therapy Diagnosis:   Encounter Diagnoses   Name Primary?    Achilles tendinitis of both lower extremities, insertion     Ankle weakness Yes     Physician: Parth Estrella MD    Physician Orders: Physical Therapy Evaluate and Treat  Medical Diagnosis from Referral: bilateral achilles tendonitis  Evaluation Date: 1/17/24  Authorization Period Expiration: 1/10/26  Plan of Care Expiration: 1/17/2025 to 4/17/25  Visit # / Visits authorized: 1/1 (pending additional authorization following initial evaluation)   FOTO: 0/3  on 1/17/25      Time In: 230p  Time Out: 330p  Total Billable Time: 60 minutes    Precautions: standard    Subjective     History of current condition - Esdras reports: bilateral medial ankle pain for over a year    Multiple episodes of "back spasms" in 12/2024    Can't walk for more than a mile    Oral steroid has helped decrease pain     Medical History:   No past medical history on file.    Surgical History:   Esdras Solorio  has a past surgical history that includes Hysterectomy.    Medications:   Esdras has a current medication list which includes the following prescription(s): methylprednisolone.    Allergies:   Review of patient's allergies indicates:  No Known Allergies     Imaging: FINDINGS:  Skeletal structures show no acute fracture or dislocation.  Both lateral views show flat longitudinal arches.  Both the forefeet shows similar postoperative findings consistent with bunionectomy and healed, distal 1st metatarsal osteotomies with screw fixation.  Both the 1st MTP joints have some degenerative joint disease with mild narrowing, small spurs and subcortical cystic change.  Other MTP and IP joint spaces are satisfactory.  No focal soft tissue swelling is identified.     Impression:     No acute abnormality.  Pes planus.  Status post bilateral bunionectomies.  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    Prior Therapy: no  Social History: 45 yo female lives with SO and family  Occupation:  mostly [employer] work  Prior Level of Function: no limitation  Current Level of Function: limited with recreational activity    Pain:  Current 3/10, worst 7/10, best 3/10   Location: bilateral medial ankle  Description: Burning and Sharp  Aggravating Factors: standing/walking  Easing Factors: rest    Pts goals: Pt would like to return to walking and recreational activity with no increase in foot pain.    Objective     WNL=within normal limits  WFL=within functional limits  NT=not tested  *=pain    Posture: bilateral foot pronation in standing  Palpation: tenderness to palpation at left > right tib posterior tendon, medial plantar heel  Sensation: intact  Deep tendon reflexes: NT      Active Range of Motion: WNL -  Hypermobile      Strength:  Ankle Right Left   Dorsiflexion 4+/5 4/5   Plantarflexion 4/5 4/5   Inversion 4/5 3+/5   Eversion 4+/5 4/5     Special Tests:  Ankle Right Left   Anterior Drawer (--) (--)   Talar Tilt (--) (--)   Squeeze Test (--) (--)   Dorsiflexion-External Rotation Test (+) (+)       Joint Mobility: ankle hypermobility, Great toe hypomobility    Lower Extremity Strength:    Right LE   Left LE      Iliopsoas:  L2 4/5 Iliopsoas: L2 3+/5    Quadriceps:  L3 - femoral nerve 4+/5 Quadriceps: L3 - femoral nerve 4/5    Hip adduction:  L3 - obterator 4+/5 Hip adduction: L3 - obterator 4/5    Hamstrings:  S2 5/5 Hamstrings: S2 4+/5    Ankle DF/EV:  L4 5/5 Ankle DF/EV: L4 4/5    GT Ext:  L5 4+/5 GT Ext L5 4/5    Hip Abduction:  L5-S1 - Superior gluteal nerve 4/5 Hip Abduction: L5-S1 - Superior gluteal nerve 4/5    Hip extension:  L5-S2 - Inferior gluteal nerve 4/5 Hip extension: L5-S2 - Inferior gluteal nerve 3+/5    Ankle PF:   S1 - tibial nerve 4/5 Ankle PF S1 - tibial nerve 4/5                Functional Tests:   SLS EO: 30 secs  SLS EC: NT  Double leg squat: WNL -  increased bilateral foot pronation  Single leg squat: increased foot pronation  Single leg calf raise: WNL *pt reported significant muscle burn at gastroc      Slump R: negative  Slump L: negative    SLR R: negative  SLR L: negative      Joint mobility:   Thoracic: hypermobility with PA segmental mobility assessment.    Lumbar: hypermobility with PA segmental mobility assessment.      Gait analysis: NT      CMS Impairment/Limitation/Restriction for FOTO Survey    Therapist reviewed FOTO scores for Esdras Solorio on 1/17/2025.   FOTO documents entered into [employer] - see Media section.    Limitation Score: 66%  Predicted Goal: 39%    Category: Mobility     TREATMENT     Home exercise program prescribed. See attached home exercise program         Home Exercises and Patient Education Provided:    Education provided:   - Findings; prognosis and plan of care (POC)  - Home exercise program (HEP)  - Modality options  - Therapist contact information    Written Home Exercises Provided: Yes  Exercises were reviewed and Esdras was able to demonstrate them prior to the end of the session.  Esdras demonstrated good understanding of the education provided.       Assessment     Esdras is a 44 y.o. female referred to outpatient Physical Therapy with a medical diagnosis of bilateral achilles tendonitis. Pt presents to PT with pain, decreased ankle strength and flexibility, poor posture, and functional deficits with walking and recreational activity. These deficits are negatively impacting this patient's ability to complete their work duties and activities of daily living.     Pt prognosis is Good.   Pt will benefit from skilled outpatient Physical Therapy to address the deficits stated above and in the chart below, provide pt/family education, and to maximize pt's level of independence.     Plan of care discussed with patient: Yes  Pt's spiritual, cultural and educational needs considered and pt agreeable to plan of care and goals as  stated below:     Anticipated Barriers for therapy: None      Medical Necessity is demonstrated by the following  History  Co-morbidities and personal factors that may impact the plan of care Co-morbidities:   NA    Personal Factors:   no deficits     low   Examination  Body Structures and Functions, activity limitations and participation restrictions that may impact the plan of care Body Regions:   lower extremities    Body Systems:    strength  balance  gait  motor control    Participation Restrictions:   Walking    Activity limitations:   Learning and applying knowledge  no deficits    General Tasks and Commands  No Deficits    Communication  No Deficits    Mobility  walking    Self care  no deficits    Domestic Life  No Deficits    Interactions/Relationships  No Deficits    Life Areas  No Deficits    Community and Social Life  No Deficits         low   Clinical Presentation stable and uncomplicated low   Decision Making/ Complexity Score: low     GOALS:  Short Term Goals (4 Weeks):  1. Patient will be compliant with home exercise program to supplement therapy in restoring pain free function. (progressing, not met)    2. Patient will improve impaired lower extremity manual muscle tests to >/= 4/5 to improve dynamic hip/knee support for functional tasks. (progressing, not met)    3. Pt will tolerate walking for 30 minutes with no increase in foot pain to return to PLOF. (progressing, not met)    4. Pt will improve foot pain to 3/10 at worst for improved QOL. (progressing, not met)        Long Term Goals (8 Weeks):  1. Patient will improve FOTO score to </= 39% limited to decrease perceived limitation with mobility. (progressing, not met)    2. Patient will improve impaired lower extremity manual muscle tests to >/= 4+/5 to improve dynamic hip/knee support for functional tasks. (progressing, not met)    3. Patient will tolerate walking for 60 minutes with no increase in foot pain to return to PLOF. (progressing,  not met)            Plan     Plan of care Certification: 1/17/2025 to 4/17/25    Outpatient Physical Therapy 1 times weekly for 12 weeks to include the following interventions: Therapeutic Exercises, Manual Therapeutic Technique, Neuromuscular Re Education, Therapeutic Activities. Modalities, Kinesiotape prn, and Functional Dry Needling as needed.    Patrick Martinez, PT,  DPT, OCS

## 2025-01-24 PROBLEM — R29.898 ANKLE WEAKNESS: Status: ACTIVE | Noted: 2025-01-24

## 2025-01-25 DIAGNOSIS — T85.848A PAIN FROM IMPLANTED HARDWARE, INITIAL ENCOUNTER: Primary | ICD-10-CM

## 2025-01-29 ENCOUNTER — TELEPHONE (OUTPATIENT)
Dept: ORTHOPEDICS | Facility: CLINIC | Age: 45
End: 2025-01-29
Payer: COMMERCIAL

## 2025-01-29 ENCOUNTER — PATIENT MESSAGE (OUTPATIENT)
Dept: PREADMISSION TESTING | Facility: HOSPITAL | Age: 45
End: 2025-01-29
Payer: COMMERCIAL

## 2025-01-29 RX ORDER — HYDROCODONE BITARTRATE AND ACETAMINOPHEN 5; 325 MG/1; MG/1
1 TABLET ORAL
COMMUNITY
End: 2025-02-25 | Stop reason: SDUPTHER

## 2025-01-29 RX ORDER — IBUPROFEN 800 MG/1
800 TABLET ORAL
COMMUNITY

## 2025-01-29 NOTE — TELEPHONE ENCOUNTER
I spoke with pt,confirmed surgery arrival time of 8am. [facility] A,nothing to eat or drink after midnight.  Patient verbalized understanding.

## 2025-01-29 NOTE — ANESTHESIA PAT ROS NOTE
01/29/2025  Esdras Solorio is a 44 y.o., female.      Pre-op Assessment    I have reviewed the Patient Summary Reports.       I have reviewed the Medications.     Review of Systems  Anesthesia Hx:    Past anesthesia records not available              Social:  Non-Smoker, Social Alcohol Use       Hematology/Oncology:  Hematology Normal   Oncology Normal                                   EENT/Dental:  EENT/Dental Normal           Cardiovascular:  Cardiovascular Normal Exercise tolerance: good       Denies CAD.       Denies Angina.       Denies DAVIS.    Patient not on beta blockers                          Pulmonary:  Pulmonary Normal   Denies COPD.  Denies Asthma.   Denies Shortness of breath.                  Renal/:  Renal/ Normal                 Hepatic/GI:  Hepatic/GI Normal     Denies GERD. Denies Liver Disease.   Not Taking GLP-1 Agonists            Musculoskeletal:     Pain from implanted hardware, bilateral big toe screws,  H/O Achilles tendinitis of both lower extremities, insertion,  Bilateral Bunionectomies, [facility] Osteotomies in 2003       Spine Disorders: cervical Degenerative disease           OB/GYN/PEDS:  Hysterectomy           Neurological:  Neurology Normal   Denies CVA.    Denies Seizures.                                Endocrine:  Endocrine Normal Denies Diabetes. Denies Hypothyroidism.          Psych:  Psychiatric Normal                   No past medical history on file.  Past Surgical History:   Procedure Laterality Date    HYSTERECTOMY         Anesthesia Assessment: Preoperative EQUATION    Planned Procedure: Procedure(s) (LRB):  REMOVAL, HARDWARE, FOOT, 1st metatarsal screws (Bilateral)  Requested Anesthesia Type:Choice  Surgeon: Parth Estrella MD  Service: Orthopedics  Known or anticipated Date of Surgery:2/5/2025    Surgeon notes: reviewed    Electronic QUestionnaire Assessment  completed via nurse interview with patient.        Triage considerations:     The patient has no apparent active cardiac condition (No unstable coronary Syndrome such as severe unstable angina or recent [<1 month] myocardial infarction, decompensated CHF, severe valvular   disease or significant arrhythmia)    Previous anesthesia records:Not available    Last PCP note:  No primary care visits noted upon chart review.   Subspecialty notes: n/a    Other important co-morbidities:  No co-morbidities noted.       Tests already available:  No recent tests.             Instructions given. (See in Nurse's note) Preop medication instructions sent via portal message.     Optimization:  Anesthesia Preop Clinic Assessment Not Indicated    Medical Opinion Indicated: No       Sub-specialist consult indicated: No      Plan: Consultation: Medical clearance is not requested.       Navigation:  Straight Line to surgery.               No tests, anesthesia preop clinic visit, or consult required.                        Patient is OK to proceed with surgery at [facility].       Ht: 5'5  Wt: 56.7 kg (125 lb)  BMI: 20.80

## 2025-02-03 ENCOUNTER — CLINICAL SUPPORT (OUTPATIENT)
Dept: REHABILITATION | Facility: OTHER | Age: 45
End: 2025-02-03
Payer: COMMERCIAL

## 2025-02-03 ENCOUNTER — OFFICE VISIT (OUTPATIENT)
Dept: ORTHOPEDICS | Facility: CLINIC | Age: 45
End: 2025-02-03
Payer: COMMERCIAL

## 2025-02-03 ENCOUNTER — ANESTHESIA EVENT (OUTPATIENT)
Dept: SURGERY | Facility: HOSPITAL | Age: 45
End: 2025-02-03
Payer: COMMERCIAL

## 2025-02-03 DIAGNOSIS — Z98.890 S/P HARDWARE REMOVAL: Primary | ICD-10-CM

## 2025-02-03 DIAGNOSIS — R29.898 ANKLE WEAKNESS: Primary | ICD-10-CM

## 2025-02-03 PROCEDURE — 97112 NEUROMUSCULAR REEDUCATION: CPT | Mod: PN

## 2025-02-03 PROCEDURE — 99499 UNLISTED E&M SERVICE: CPT | Mod: S$GLB,,, | Performed by: NURSE PRACTITIONER

## 2025-02-03 PROCEDURE — 99999 PR PBB SHADOW E&M-EST. PATIENT-LVL III: CPT | Mod: PBBFAC,,, | Performed by: NURSE PRACTITIONER

## 2025-02-03 PROCEDURE — 97014 ELECTRIC STIMULATION THERAPY: CPT | Mod: PN

## 2025-02-03 PROCEDURE — 20560 NDL INSJ W/O NJX 1 OR 2 MUSC: CPT | Mod: PN,CG

## 2025-02-03 PROCEDURE — 97140 MANUAL THERAPY 1/> REGIONS: CPT | Mod: PN

## 2025-02-03 PROCEDURE — 97110 THERAPEUTIC EXERCISES: CPT | Mod: PN

## 2025-02-03 RX ORDER — OXYCODONE HYDROCHLORIDE 5 MG/1
TABLET ORAL
Qty: 30 TABLET | Refills: 0 | Status: SHIPPED | OUTPATIENT
Start: 2025-02-03

## 2025-02-03 RX ORDER — NAPROXEN 500 MG/1
500 TABLET ORAL 2 TIMES DAILY
Qty: 60 TABLET | Refills: 0 | Status: SHIPPED | OUTPATIENT
Start: 2025-02-03

## 2025-02-03 RX ORDER — DEXTROMETHORPHAN HYDROBROMIDE, GUAIFENESIN 5; 100 MG/5ML; MG/5ML
650 LIQUID ORAL EVERY 8 HOURS
Qty: 120 TABLET | Refills: 0 | Status: SHIPPED | OUTPATIENT
Start: 2025-02-03

## 2025-02-03 NOTE — PROGRESS NOTES
[facility]   Physical Therapy Treatment Note     Name: Esdras Solorio  Clinic Number: [id number]    Therapy Diagnosis:   Encounter Diagnosis   Name Primary?    Ankle weakness Yes     Physician: Parth Estrella MD    Visit Date: 2/3/2025    Physician Orders: Physical Therapy Evaluate and Treat  Medical Diagnosis from Referral: bilateral achilles tendonitis  Evaluation Date: 1/17/24  Authorization Period Expiration: 1/10/26  Plan of Care Expiration: 1/17/2025 to 4/17/25  Visit # / Visits authorized: 1/20initial evaluation)   FOTO: 0/3  on 1/17/25    Time In: 1100a  Time Out: 1155a  Total Billable Time: 55 minutes    SUBJECTIVE     Pt reports: minimal pain since last visit. She has been wearing supportive shoes at home and keeping her orthotics in for day to day activity.  She was not compliant with home exercise program.  Response to previous treatment: NA  Functional change: NA    Pain:  Current 3/10, worst 7/10, best 3/10   Location: bilateral medial ankle  Description: Burning and Sharp  Aggravating Factors: standing/walking  Easing Factors: rest     Pts goals: Pt would like to return to walking and recreational activity with no increase in foot pain.    OBJECTIVE     Objective Measures updated at progress report unless specified.     Treatment     Esdras received the treatments listed below in bold:      therapeutic exercises to develop strength, endurance, ROM, and flexibility for 10 minutes including:    Recumbent bike L3 x 5 minutes  SL heel rise on stair 2x15  Bridges x20      manual therapy techniques: Myofacial release were applied to the bilateral lower extremity for 10 minutes, including:    Application of TDN: Pt educated on benefits and potential side effects of dry needling. Educated pt on benefits, precautions, side effects following TDN. Educated pt to use heat following treatment sessions if pt is experiencing pain or soreness. Pt verbalized good understanding of  education.  Pt signed written consent to dry needling. Pt gave verbal consent for DN    Pt received dry needling to the below listed muscles using 60 mm needles.    Bilateral tibialis posterior     With application of electrical stimulation treatment    Pt tolerated treatment well with no adverse events noted.      neuromuscular re-education activities to improve: Balance, Coordination, Kinesthetic, Sense, Proprioception, and Posture for 25 minutes. The following activities were included:    SLS on airex with cone tap 12-6 oclock 2x10  SLS on airex with ball toss forward/45 degrees rotation x20 each  Walking heel rise 2x50'  Dead bugs x10  Bird dogs x10  Side 1/2 plank with hip abduction 2x10        Patient Education and Home Exercises     Home Exercises Provided and Patient Education Provided     Education provided:   - Findings; prognosis and plan of care (POC)  - Home exercise program (HEP)  - Modality options  - Therapist contact information     Written Home Exercises Provided: Yes  Exercises were reviewed and Esdras was able to demonstrate them prior to the end of the session.  Esdras demonstrated good understanding of the education provided.     Written Home Exercises Provided: Patient instructed to cont prior HEP. Exercises were reviewed and Esdras was able to demonstrate them prior to the end of the session.  Esdras demonstrated good  understanding of the education provided. See EMR under Patient Instructions for exercises provided during therapy sessions    ASSESSMENT     Pt tolerated therapeutic interventions well with no complaint of increased pain. Patient reported relief after physical therapy treatment today. Right ankle stability and left hip stability were less efficient today. We will continue to progress as tolerated.    Esdras Is progressing well towards her goals.   Pt prognosis is Good.     Pt will continue to benefit from skilled outpatient physical therapy to address the deficits listed  in the problem list box on initial evaluation, provide pt/family education and to maximize pt's level of independence in the home and community environment.     Pt's spiritual, cultural and educational needs considered and pt agreeable to plan of care and goals.     Anticipated barriers to physical therapy: NA    Goals: GOALS:  Short Term Goals (4 Weeks):  1. Patient will be compliant with home exercise program to supplement therapy in restoring pain free function. (progressing, not met)    2. Patient will improve impaired lower extremity manual muscle tests to >/= 4/5 to improve dynamic hip/knee support for functional tasks. (progressing, not met)    3. Pt will tolerate walking for 30 minutes with no increase in foot pain to return to PLOF. (progressing, not met)    4. Pt will improve foot pain to 3/10 at worst for improved QOL. (progressing, not met)          Long Term Goals (8 Weeks):  1. Patient will improve FOTO score to </= 39% limited to decrease perceived limitation with mobility. (progressing, not met)    2. Patient will improve impaired lower extremity manual muscle tests to >/= 4+/5 to improve dynamic hip/knee support for functional tasks. (progressing, not met)    3. Patient will tolerate walking for 60 minutes with no increase in foot pain to return to PLOF. (progressing, not met)         PLAN     Plan of care Certification: 1/17/2025 to 4/17/25     Outpatient Physical Therapy 1 times weekly for 12 weeks to include the following interventions: Therapeutic Exercises, Manual Therapeutic Technique, Neuromuscular Re Education, Therapeutic Activities. Modalities, Kinesiotape prn, and Functional Dry Needling as needed.    Patrick Martinez, PT    Yes

## 2025-02-03 NOTE — H&P (VIEW-ONLY)
CC:  Bilateral great toe pain    Esdras Solorio is a 44 y.o. female with history of Bilateral great toe pain. She has a history of bilateral bunionectomy's 23 years ago and has recently had pain which she relates to the screws retained in the toes. Pain is worse with activity and weight bearing.  Patient has experienced interference of activities of daily living due to decreased range of motion and an increase in joint pain and swelling.  Patient has failed non-operative treatment including NSAIDs and activity modification.  Esdras Solorio currently ambulates independently.     Relevant medical conditions of significance in perioperative period:  none      No past medical history on file.    Past Surgical History:   Procedure Laterality Date    HYSTERECTOMY         No family history on file.    Review of patient's allergies indicates:  No Known Allergies      Current Outpatient Medications:     acetaminophen (TYLENOL) 650 MG TbSR, Take 1 tablet (650 mg total) by mouth every 8 (eight) hours., Disp: 120 tablet, Rfl: 0    HYDROcodone-acetaminophen (NORCO) 5-325 mg per tablet, Take 1 tablet by mouth. Take as prescribed, Disp: , Rfl:     ibuprofen (ADVIL,MOTRIN) 800 MG tablet, Take 800 mg by mouth after meals as needed for Pain., Disp: , Rfl:     naproxen (NAPROSYN) 500 MG tablet, Take 1 tablet (500 mg total) by mouth 2 (two) times daily., Disp: 60 tablet, Rfl: 0    oxyCODONE (ROXICODONE) 5 MG immediate release tablet, Take 1-2 tabs every 4-6 hours as needed for pain, Disp: 30 tablet, Rfl: 0    Review of Systems:  Constitutional: no fever or chills  Eyes: no visual changes  ENT: no nasal congestion or sore throat  Respiratory: no cough or shortness of breath  Cardiovascular: no chest pain or palpitations  Gastrointestinal: no nausea or vomiting, tolerating diet  Genitourinary: no hematuria or dysuria  Integument/Breast: no rash or pruritis  Hematologic/Lymphatic: no easy bruising or lymphadenopathy  Musculoskeletal: positive  for bilateral great toe pain  Neurological: no seizures or tremors  Behavioral/Psych: no auditory or visual hallucinations  Endocrine: no heat or cold intolerance    PE:  There were no vitals taken for this visit.  General: Pleasant, cooperative, NAD   Gait: antalgic  HEENT: NCAT, sclera nonicteric   Lungs: Respirations clear bilaterally; equal and unlabored.   CV: S1S2; 2+ bilateral upper and lower extremity pulses.   Skin: Intact throughout with no rashes, erythema, or lesions  Extremities: No LE edema,  no erythema or warmth of the skin in either lower extremity.    Bilateral great toeexam:  Knee Range of Motion:normal, pain with passive range of motion  Effusion:none  Condition of skin:intact    Radiographs: Radiographs reveal that both the forefeet shows similar postoperative findings consistent with bunionectomy and healed, distal 1st metatarsal osteotomies with screw fixation. Both the 1st MTP joints have some degenerative joint disease with mild narrowing, small spurs and subcortical cystic change     Diagnosis: retained hardware noted to both great toes    Plan: removal of retained hardware in bilateral great toes

## 2025-02-03 NOTE — PROGRESS NOTES
Esdras Solorio is a 44 y.o. year old here today for preoperative visit in preparation for a Bilateral great toe hardware removal to be performed by Dr. Estrella on 2/5/2025.  she was last seen and treated in the clinic on 1/10/2025. There has been no significant change in medical status since last visit. No fever, chills, malaise, or unexplained weight change.      Allergies, Medications, past medical and surgical history reviewed.    Focused examination performed.    Patient saw surgeon in clinic today. All questions answered. Patient encouraged to call with questions. Contact information given.     She won't require DME and will be able to weight bear as tolerated with an open toe shoe after surgery    Esdras Solorio verbalized an understanding to the education and goals. Patient has displayed readiness to engage in care and is ready to proceed with surgery.  Patient reports her , Jerman, is able and ready to provide assistance at home after discharge.    Surgical consents signed.        Esdras Solorio will contact us if there are any questions, concerns, or changes in medical status prior to surgery.

## 2025-02-04 ENCOUNTER — TELEPHONE (OUTPATIENT)
Dept: ORTHOPEDICS | Facility: CLINIC | Age: 45
End: 2025-02-04
Payer: COMMERCIAL

## 2025-02-04 NOTE — TELEPHONE ENCOUNTER
I spoke with pt,confirmed surgery arrival time of 7:15am. [facility] A,nothing to eat or drink after midnight. Pt,verbalized understanding.

## 2025-02-05 ENCOUNTER — HOSPITAL ENCOUNTER (OUTPATIENT)
Facility: HOSPITAL | Age: 45
Discharge: HOME | End: 2025-02-05
Attending: ORTHOPAEDIC SURGERY | Admitting: ORTHOPAEDIC SURGERY
Payer: COMMERCIAL

## 2025-02-05 ENCOUNTER — ANESTHESIA (OUTPATIENT)
Dept: SURGERY | Facility: HOSPITAL | Age: 45
End: 2025-02-05
Payer: COMMERCIAL

## 2025-02-05 VITALS
RESPIRATION RATE: 27 BRPM | DIASTOLIC BLOOD PRESSURE: 64 MMHG | WEIGHT: 125 LBS | HEART RATE: 67 BPM | SYSTOLIC BLOOD PRESSURE: 96 MMHG | BODY MASS INDEX: 20.83 KG/M2 | HEIGHT: 65 IN | OXYGEN SATURATION: 98 % | TEMPERATURE: 98 F

## 2025-02-05 DIAGNOSIS — T85.848A PAIN FROM IMPLANTED HARDWARE, INITIAL ENCOUNTER: Primary | ICD-10-CM

## 2025-02-05 PROCEDURE — 36000706: Performed by: ORTHOPAEDIC SURGERY

## 2025-02-05 PROCEDURE — 71000015 HC POSTOP RECOV 1ST HR: Performed by: ORTHOPAEDIC SURGERY

## 2025-02-05 PROCEDURE — 25000003 PHARM REV CODE 250: Performed by: NURSE ANESTHETIST, CERTIFIED REGISTERED

## 2025-02-05 PROCEDURE — 36000707: Performed by: ORTHOPAEDIC SURGERY

## 2025-02-05 PROCEDURE — D9220A PRA ANESTHESIA: Mod: ANES,,, | Performed by: STUDENT IN AN ORGANIZED HEALTH CARE EDUCATION/TRAINING PROGRAM

## 2025-02-05 PROCEDURE — 71000033 HC RECOVERY, INTIAL HOUR: Performed by: ORTHOPAEDIC SURGERY

## 2025-02-05 PROCEDURE — 99900035 HC TECH TIME PER 15 MIN (STAT)

## 2025-02-05 PROCEDURE — 94761 N-INVAS EAR/PLS OXIMETRY MLT: CPT

## 2025-02-05 PROCEDURE — D9220A PRA ANESTHESIA: Mod: CRNA,,, | Performed by: NURSE ANESTHETIST, CERTIFIED REGISTERED

## 2025-02-05 PROCEDURE — 63600175 PHARM REV CODE 636 W HCPCS: Performed by: NURSE ANESTHETIST, CERTIFIED REGISTERED

## 2025-02-05 PROCEDURE — 25000003 PHARM REV CODE 250: Performed by: STUDENT IN AN ORGANIZED HEALTH CARE EDUCATION/TRAINING PROGRAM

## 2025-02-05 PROCEDURE — 25000003 PHARM REV CODE 250: Performed by: PHYSICIAN ASSISTANT

## 2025-02-05 PROCEDURE — 63600175 PHARM REV CODE 636 W HCPCS: Performed by: ORTHOPAEDIC SURGERY

## 2025-02-05 PROCEDURE — 37000008 HC ANESTHESIA 1ST 15 MINUTES: Performed by: ORTHOPAEDIC SURGERY

## 2025-02-05 PROCEDURE — 37000009 HC ANESTHESIA EA ADD 15 MINS: Performed by: ORTHOPAEDIC SURGERY

## 2025-02-05 PROCEDURE — 20680 REMOVAL OF IMPLANT DEEP: CPT | Mod: LT,,, | Performed by: ORTHOPAEDIC SURGERY

## 2025-02-05 RX ORDER — OXYCODONE HYDROCHLORIDE 5 MG/1
5 TABLET ORAL
Status: DISCONTINUED | OUTPATIENT
Start: 2025-02-05 | End: 2025-02-05 | Stop reason: HOSPADM

## 2025-02-05 RX ORDER — SODIUM CHLORIDE 0.9 % (FLUSH) 0.9 %
10 SYRINGE (ML) INJECTION
Status: DISCONTINUED | OUTPATIENT
Start: 2025-02-05 | End: 2025-02-05 | Stop reason: HOSPADM

## 2025-02-05 RX ORDER — MIDAZOLAM HYDROCHLORIDE 1 MG/ML
INJECTION INTRAMUSCULAR; INTRAVENOUS
Status: DISCONTINUED | OUTPATIENT
Start: 2025-02-05 | End: 2025-02-05

## 2025-02-05 RX ORDER — HYDROMORPHONE HYDROCHLORIDE 1 MG/ML
0.2 INJECTION, SOLUTION INTRAMUSCULAR; INTRAVENOUS; SUBCUTANEOUS EVERY 5 MIN PRN
Status: DISCONTINUED | OUTPATIENT
Start: 2025-02-05 | End: 2025-02-05 | Stop reason: HOSPADM

## 2025-02-05 RX ORDER — PROPOFOL 10 MG/ML
VIAL (ML) INTRAVENOUS CONTINUOUS PRN
Status: DISCONTINUED | OUTPATIENT
Start: 2025-02-05 | End: 2025-02-05

## 2025-02-05 RX ORDER — FAMOTIDINE 10 MG/ML
INJECTION INTRAVENOUS
Status: DISCONTINUED | OUTPATIENT
Start: 2025-02-05 | End: 2025-02-05

## 2025-02-05 RX ORDER — LIDOCAINE HYDROCHLORIDE 10 MG/ML
INJECTION, SOLUTION INFILTRATION; PERINEURAL
Status: DISCONTINUED | OUTPATIENT
Start: 2025-02-05 | End: 2025-02-05 | Stop reason: HOSPADM

## 2025-02-05 RX ORDER — CELECOXIB 200 MG/1
400 CAPSULE ORAL
Status: COMPLETED | OUTPATIENT
Start: 2025-02-05 | End: 2025-02-05

## 2025-02-05 RX ORDER — ONDANSETRON HYDROCHLORIDE 2 MG/ML
INJECTION, SOLUTION INTRAVENOUS
Status: DISCONTINUED | OUTPATIENT
Start: 2025-02-05 | End: 2025-02-05

## 2025-02-05 RX ORDER — OXYCODONE HYDROCHLORIDE 5 MG/1
5 TABLET ORAL EVERY 4 HOURS PRN
Status: DISCONTINUED | OUTPATIENT
Start: 2025-02-05 | End: 2025-02-05 | Stop reason: HOSPADM

## 2025-02-05 RX ORDER — FENTANYL CITRATE 50 UG/ML
25 INJECTION, SOLUTION INTRAMUSCULAR; INTRAVENOUS EVERY 5 MIN PRN
Status: DISCONTINUED | OUTPATIENT
Start: 2025-02-05 | End: 2025-02-05 | Stop reason: HOSPADM

## 2025-02-05 RX ORDER — PROGESTERONE 100 MG/1
100 CAPSULE ORAL DAILY
COMMUNITY

## 2025-02-05 RX ORDER — ONDANSETRON HYDROCHLORIDE 2 MG/ML
4 INJECTION, SOLUTION INTRAVENOUS DAILY PRN
Status: DISCONTINUED | OUTPATIENT
Start: 2025-02-05 | End: 2025-02-05 | Stop reason: HOSPADM

## 2025-02-05 RX ORDER — HALOPERIDOL 5 MG/ML
0.5 INJECTION INTRAMUSCULAR EVERY 10 MIN PRN
Status: DISCONTINUED | OUTPATIENT
Start: 2025-02-05 | End: 2025-02-05 | Stop reason: HOSPADM

## 2025-02-05 RX ORDER — OXYCODONE HYDROCHLORIDE 5 MG/1
10 TABLET ORAL EVERY 4 HOURS PRN
Status: DISCONTINUED | OUTPATIENT
Start: 2025-02-05 | End: 2025-02-05 | Stop reason: HOSPADM

## 2025-02-05 RX ORDER — ONDANSETRON 4 MG/1
8 TABLET, ORALLY DISINTEGRATING ORAL EVERY 8 HOURS PRN
Status: DISCONTINUED | OUTPATIENT
Start: 2025-02-05 | End: 2025-02-05 | Stop reason: HOSPADM

## 2025-02-05 RX ORDER — ACETAMINOPHEN 500 MG
1000 TABLET ORAL
Status: COMPLETED | OUTPATIENT
Start: 2025-02-05 | End: 2025-02-05

## 2025-02-05 RX ORDER — PHENYLEPHRINE HYDROCHLORIDE 10 MG/ML
INJECTION INTRAVENOUS
Status: DISCONTINUED | OUTPATIENT
Start: 2025-02-05 | End: 2025-02-05

## 2025-02-05 RX ORDER — MIDAZOLAM HYDROCHLORIDE 1 MG/ML
1 INJECTION, SOLUTION INTRAMUSCULAR; INTRAVENOUS
Status: DISCONTINUED | OUTPATIENT
Start: 2025-02-05 | End: 2025-02-05 | Stop reason: HOSPADM

## 2025-02-05 RX ORDER — FENTANYL CITRATE 50 UG/ML
100 INJECTION, SOLUTION INTRAMUSCULAR; INTRAVENOUS
Status: DISCONTINUED | OUTPATIENT
Start: 2025-02-05 | End: 2025-02-05 | Stop reason: HOSPADM

## 2025-02-05 RX ORDER — ACETAMINOPHEN 325 MG/1
650 TABLET ORAL EVERY 4 HOURS PRN
Status: DISCONTINUED | OUTPATIENT
Start: 2025-02-05 | End: 2025-02-05 | Stop reason: HOSPADM

## 2025-02-05 RX ORDER — MUPIROCIN 20 MG/G
OINTMENT TOPICAL 2 TIMES DAILY
Status: DISCONTINUED | OUTPATIENT
Start: 2025-02-05 | End: 2025-02-05 | Stop reason: HOSPADM

## 2025-02-05 RX ORDER — FENTANYL CITRATE 50 UG/ML
INJECTION, SOLUTION INTRAMUSCULAR; INTRAVENOUS
Status: DISCONTINUED | OUTPATIENT
Start: 2025-02-05 | End: 2025-02-05

## 2025-02-05 RX ORDER — METOCLOPRAMIDE 10 MG/1
10 TABLET ORAL EVERY 6 HOURS PRN
Status: DISCONTINUED | OUTPATIENT
Start: 2025-02-05 | End: 2025-02-05 | Stop reason: HOSPADM

## 2025-02-05 RX ORDER — CLONAZEPAM 0.5 MG/1
0.5 TABLET ORAL 2 TIMES DAILY PRN
COMMUNITY

## 2025-02-05 RX ORDER — DEXAMETHASONE SODIUM PHOSPHATE 4 MG/ML
INJECTION, SOLUTION INTRA-ARTICULAR; INTRALESIONAL; INTRAMUSCULAR; INTRAVENOUS; SOFT TISSUE
Status: DISCONTINUED | OUTPATIENT
Start: 2025-02-05 | End: 2025-02-05

## 2025-02-05 RX ORDER — LIDOCAINE HYDROCHLORIDE 20 MG/ML
INJECTION INTRAVENOUS
Status: DISCONTINUED | OUTPATIENT
Start: 2025-02-05 | End: 2025-02-05

## 2025-02-05 RX ORDER — CEFAZOLIN 2 G/1
2 INJECTION, POWDER, FOR SOLUTION INTRAMUSCULAR; INTRAVENOUS
Status: COMPLETED | OUTPATIENT
Start: 2025-02-05 | End: 2025-02-05

## 2025-02-05 RX ADMIN — LIDOCAINE HYDROCHLORIDE 75 MG: 20 INJECTION INTRAVENOUS at 09:02

## 2025-02-05 RX ADMIN — ACETAMINOPHEN 1000 MG: 500 TABLET ORAL at 07:02

## 2025-02-05 RX ADMIN — OXYCODONE 5 MG: 5 TABLET ORAL at 11:02

## 2025-02-05 RX ADMIN — CELECOXIB 400 MG: 100 CAPSULE ORAL at 07:02

## 2025-02-05 RX ADMIN — FENTANYL CITRATE 100 MCG: 50 INJECTION, SOLUTION INTRAMUSCULAR; INTRAVENOUS at 09:02

## 2025-02-05 RX ADMIN — PROPOFOL 150 MCG/KG/MIN: 10 INJECTION, EMULSION INTRAVENOUS at 09:02

## 2025-02-05 RX ADMIN — ONDANSETRON 4 MG: 2 INJECTION INTRAMUSCULAR; INTRAVENOUS at 09:02

## 2025-02-05 RX ADMIN — DEXAMETHASONE SODIUM PHOSPHATE 8 MG: 4 INJECTION, SOLUTION INTRAMUSCULAR; INTRAVENOUS at 09:02

## 2025-02-05 RX ADMIN — SODIUM CHLORIDE, SODIUM GLUCONATE, SODIUM ACETATE, POTASSIUM CHLORIDE, MAGNESIUM CHLORIDE, SODIUM PHOSPHATE, DIBASIC, AND POTASSIUM PHOSPHATE: .53; .5; .37; .037; .03; .012; .00082 INJECTION, SOLUTION INTRAVENOUS at 10:02

## 2025-02-05 RX ADMIN — PHENYLEPHRINE HYDROCHLORIDE 100 MCG: 10 INJECTION INTRAVENOUS at 10:02

## 2025-02-05 RX ADMIN — CEFAZOLIN 2 G: 2 INJECTION, POWDER, FOR SOLUTION INTRAMUSCULAR; INTRAVENOUS at 09:02

## 2025-02-05 RX ADMIN — PHENYLEPHRINE HYDROCHLORIDE 100 MCG: 10 INJECTION INTRAVENOUS at 09:02

## 2025-02-05 RX ADMIN — FAMOTIDINE 20 MG: 10 INJECTION, SOLUTION INTRAVENOUS at 09:02

## 2025-02-05 RX ADMIN — SODIUM CHLORIDE: 0.9 INJECTION, SOLUTION INTRAVENOUS at 07:02

## 2025-02-05 RX ADMIN — MIDAZOLAM HYDROCHLORIDE 2 MG: 2 INJECTION, SOLUTION INTRAMUSCULAR; INTRAVENOUS at 08:02

## 2025-02-05 NOTE — BRIEF OP NOTE
[facility] ([facility])  Brief Operative Note    Surgery Date: 2/5/2025     Surgeons and Role:     * Parth Estrella MD - Primary    Assisting Surgeon: None    Pre-op Diagnosis:  Pain from implanted hardware, initial encounter [T85.848A]    Post-op Diagnosis:  Post-Op Diagnosis Codes:     * Pain from implanted hardware, initial encounter [T85.848A]    Procedure(s) (LRB):  REMOVAL, HARDWARE, FOOT, 1st metatarsal screws (Bilateral)    Anesthesia: Choice    Operative Findings: The above procedures were performed.  No intra-operative complications were noted.  Skin was in good condition and well-approximated at closure.       Estimated Blood Loss: * No values recorded between 2/5/2025  9:23 AM and 2/5/2025 10:56 AM *         Specimens:   Specimen (24h ago, onward)      None              Discharge Note    OUTCOME: Patient tolerated treatment/procedure well without complication and is now ready for discharge.    DISPOSITION: Home or Self Care    FINAL DIAGNOSIS:  Pain from implanted hardware    FOLLOWUP: In clinic    DISCHARGE INSTRUCTIONS:    Discharge Procedure Orders   Diet general     Sponge bath only until clinic visit     Keep surgical extremity elevated     Ice to affected area     No driving, operating heavy equipment or signing legal documents while taking pain medication     Leave dressing on - Keep it clean, dry, and intact until clinic visit     Call MD for:  temperature >100.4     Call MD for:  persistent nausea and vomiting     Call MD for:  severe uncontrolled pain     Call MD for:  difficulty breathing, headache or visual disturbances     Call MD for:  redness, tenderness, or signs of infection (pain, swelling, redness, odor or green/yellow discharge around incision site)     Call MD for:  hives     Call MD for:  persistent dizziness or light-headedness     Call MD for:  extreme fatigue     Shower on day dressing removed (No bath)     Weight bearing restrictions (specify)   Order Comments: As  tolerated

## 2025-02-05 NOTE — PLAN OF CARE
Pre op procedure complete. All questions answered. Pt lying in bed, call bell in reach. Pt's belongings in bag at bedside will leave in locker.  brought to bedside. Still needs site carlos enrique, update, & anes consent

## 2025-02-05 NOTE — OP NOTE
Operative report  Date of surgery:  02/05/2025    Preop diagnosis:  Painful hardware bilateral 1st metatarsal heads, prominent screws    Postop diagnosis: Same    Procedure:  Deep implant removal bilateral 1st metatarsal heads    Anesthesia: Local with IV sedation    Surgeon: Dr. Estrella   Assistant:     Complications:  The head of the screw in the left 1st metatarsal head broke and the remainder of the screw was left imbedded in the 1st metatarsal head    Estimated blood loss: None     Specimens:  One screw and the head of another screw    Procedure in detail:   The patient was brought to the operating room and placed on the operating table in a supine position.  IV sedation was administered by anesthesia.  Both legs prepped and draped in a sterile fashion.  Attention was turned to the left foot.  The head of the screw in the 1st metatarsal head was palpable and I use 1% lidocaine without epinephrine to anesthetize the skin overlying me screw.  The foot was exsanguinated with an Esmarch bandage and this was left around the ankle as a tourniquet.  A small incision was made through the previous surgical scar overlying the screw.  The incision was carried through the skin and subcutaneous tissue down to the screw.  The screw head was noted to be imbedded in bone in his proximal aspect and an osteotome and rongeur was used to clear the bone from the head of the screw.  I attempted to remove the screw with the screwdriver but the screw head was stripped.  I then attempted to remove the screw with the reverse threaded screw removal instruments from the screw removal set but this also did not gained enough purchase to back out the screw.  A small trephined was then used to clear some of the bone around the proximal aspect of the screw to be able to grab it with a vice .  In attempting to remove the screw with a vice  the head of the screw broke off at the level of the bone so there was no remaining screw  that was sticking out of the bone, and I made the decision to leave the broken remainder of the screw imbedded in the bone because I felt I would do more damage to the bone attempting to remove the remainder of the screw.  The wound was then irrigated and the skin incision was closed with 3-0 nylon suture.  A sterile compressive dressing was placed and attention was turned to the right foot.  The skin overlying the screw in the right foot was begin anesthetized with 1% lidocaine without epinephrine.  A small skin incision was made over the screw.  The screw head was more prominent, but again the screw was firmly imbedded in the bone and the small hex head screwdriver stripped the head attempting remove the screw.  I was able to get the vise  device over the head of the screw and was able to remove the screw using the vice .  The wound was well irrigated in his skin incision was closed with 3-0 nylon suture.  A sterile compressive dressing was placed.  The patient was returned to the postop holding area in stable condition.

## 2025-02-05 NOTE — ANESTHESIA PREPROCEDURE EVALUATION
"                                                                                                             2025  Pre-operative evaluation for Procedure(s) (LRB):  REMOVAL, HARDWARE, FOOT, 1st metatarsal screws (Bilateral)    Esdras Solorio is a 44 y.o. female     Patient Active Problem List   Diagnosis    Skin aging    Ankle weakness       Review of patient's allergies indicates:  No Known Allergies    No current facility-administered medications on file prior to encounter.     Current Outpatient Medications on File Prior to Encounter   Medication Sig Dispense Refill    HYDROcodone-acetaminophen (NORCO) 5-325 mg per tablet Take 1 tablet by mouth. Take as prescribed      ibuprofen (ADVIL,MOTRIN) 800 MG tablet Take 800 mg by mouth after meals as needed for Pain.         Past Surgical History:   Procedure Laterality Date    HYSTERECTOMY         Social History     Socioeconomic History    Marital status:    Tobacco Use    Smoking status: Never    Smokeless tobacco: Never   Substance and Sexual Activity    Alcohol use: Yes     Comment: wine         CBC: No results for input(s): "WBC", "RBC", "HGB", "HCT", "PLT", "MCV", "MCH", "MCHC" in the last 72 hours.    CMP: No results for input(s): "NA", "K", "CL", "CO2", "BUN", "CREATININE", "GLU", "MG", "PHOS", "CALCIUM", "ALBUMIN", "PROT", "ALKPHOS", "ALT", "AST", "BILITOT" in the last 72 hours.    INR  No results for input(s): "PT", "INR", "PROTIME", "APTT" in the last 72 hours.        Diagnostic Studies:      EKD Echo:  No results found for this or any previous visit.       Pre-op Assessment    I have reviewed the Patient Summary Reports.     I have reviewed the Nursing Notes. I have reviewed the NPO Status.   I have reviewed the Medications.     Review of Systems      Physical Exam  General: Well nourished and Cooperative    Airway:  Mallampati: II   Mouth Opening: Normal  TM Distance: Normal  Tongue: Normal  Neck ROM: Normal ROM    Chest/Lungs:  Clear to " auscultation, Normal Respiratory Rate    Heart:  Rate: Normal  Rhythm: Regular Rhythm  Sounds: Normal        Anesthesia Plan  Type of Anesthesia, risks & benefits discussed:    Anesthesia Type: Gen ETT, Gen Natural Airway  Intra-op Monitoring Plan: Standard ASA Monitors  Post Op Pain Control Plan: multimodal analgesia and IV/PO Opioids PRN  Induction:  IV  Airway Plan: Direct and Video, Post-Induction  Informed Consent: Informed consent signed with the Patient and all parties understand the risks and agree with anesthesia plan.  All questions answered.   ASA Score: 1    Ready For Surgery From Anesthesia Perspective.     .

## 2025-02-05 NOTE — TRANSFER OF CARE
"Anesthesia Transfer of Care Note    Patient: Esdras Solorio    Procedure(s) Performed: Procedure(s) (LRB):  REMOVAL, HARDWARE, FOOT, 1st metatarsal screws (Bilateral)    Patient location: PACU    Anesthesia Type: general    Transport from OR: Transported from OR on 6-10 L/min O2 by face mask with adequate spontaneous ventilation    Post pain: adequate analgesia    Post assessment: no apparent anesthetic complications and tolerated procedure well    Post vital signs: stable    Level of consciousness: awake, alert and oriented    Nausea/Vomiting: no nausea/vomiting    Complications: none    Transfer of care protocol was followed      Last vitals: Visit Vitals  /69 (BP Location: Right arm, Patient Position: Lying)   Pulse 75   Temp 36.7 °C (98 °F) (Oral)   Resp 18   Ht 5' 5" (1.651 m)   Wt 56.7 kg (125 lb)   SpO2 98%   Breastfeeding No   BMI 20.80 kg/m²     "

## 2025-02-05 NOTE — PLAN OF CARE
VSS.  Patient tolerating oral liquids without difficulty.   No apparent s&s of distress noted at this time, no complaints voiced at this time.   Discharge instructions reviewed with patient and spouse with good verbal feedback received.   Post op medications delivered to bedside, DME surgical shoes on and intact.  Patient ready for discharge.

## 2025-02-05 NOTE — ANESTHESIA POSTPROCEDURE EVALUATION
Anesthesia Post Evaluation    Patient: Esdras Solorio    Procedure(s) Performed: Procedure(s) (LRB):  REMOVAL, HARDWARE, FOOT, 1st metatarsal screws (Bilateral)    Final Anesthesia Type: general      Patient location during evaluation: PACU  Patient participation: Yes- Able to Participate  Level of consciousness: awake and alert  Post-procedure vital signs: reviewed and stable  Pain management: adequate  Airway patency: patent  UZAIR mitigation strategies: Multimodal analgesia  PONV status at discharge: No PONV  Anesthetic complications: no      Cardiovascular status: blood pressure returned to baseline and hemodynamically stable  Respiratory status: unassisted  Hydration status: euvolemic  Follow-up not needed.              Vitals Value Taken Time   BP 96/64 02/05/25 1132   Temp 36.6 °C (97.9 °F) 02/05/25 1100   Pulse 68 02/05/25 1140   Resp 21 02/05/25 1140   SpO2 96 % 02/05/25 1140   Vitals shown include unfiled device data.      Event Time   Out of Recovery 11:30:00         Pain/Aram Score: Pain Rating Prior to Med Admin: 5 (2/5/2025 11:04 AM)  Aram Score: 10 (2/5/2025 10:57 AM)

## 2025-02-25 ENCOUNTER — OFFICE VISIT (OUTPATIENT)
Dept: ORTHOPEDICS | Facility: CLINIC | Age: 45
End: 2025-02-25
Payer: COMMERCIAL

## 2025-02-25 DIAGNOSIS — Z98.890 S/P FOOT SURGERY, LEFT: Primary | ICD-10-CM

## 2025-02-25 PROCEDURE — 99999 PR PBB SHADOW E&M-EST. PATIENT-LVL III: CPT | Mod: PBBFAC,,, | Performed by: NURSE PRACTITIONER

## 2025-02-25 RX ORDER — HYDROCODONE BITARTRATE AND ACETAMINOPHEN 5; 325 MG/1; MG/1
1 TABLET ORAL EVERY 6 HOURS PRN
Qty: 21 TABLET | Refills: 0 | Status: SHIPPED | OUTPATIENT
Start: 2025-02-25

## 2025-02-25 NOTE — PROGRESS NOTES
HPI: Osmin Dewitt presents for post op follow up after bilateral hardware removal performed by Dr. Estrella on 2/5/2025. She had james bunionectomy approximately 23 years ago. Patient's pain is well controlled. She has minimal pain to her right foot, but has some mild pain to her left foot over the incision. The patient wore post op shoe for 1 week then progressed out of it and is wearing tennis shoes today. Feels comfort when wearing Uggs at home. States post op shoe straps hurt over her incisions. She would like a refill on pain medication.     Exam:   Constitution: NAD, nontoxic, Ambulating well without assistance.   Skin: Sutures noted over 1st MTP. Incision is clean and dry without drainage, fluctuance, edema, induration, or erythema.  MSK: James ankle normal. R foot without issues with ROM of toes and foot. Limited ROM of L 1st MTP, but rest normal. No TTP over flexor tendon of 1st MTP.     Imaging: Initial pre-operative radiographs reviewed today.     Assessment and plan:  - Band aid and sutures (2 nylon removed from right food and 3 nylon removed from left). No signs of infection or wound dehiscence.  - Patient's postoperative course is progressing appropriately.  - The patient was advised to keep the incision clean and dry for the next 24 hours after which they may wash the area with soap in the shower. Instructed to not submerge in water (bathing, lake, river, etc.) until the incision is completely healed.   - Pain medication: refilled. We discussed use of medications thoroughly.   - Follow up in 4 weeks with Dr. Estrella.    Patient instructed to call or message clinic with problems/concerns.   -Recommend continuing use of open box shoes for comfort.

## 2025-03-13 ENCOUNTER — OFFICE VISIT (OUTPATIENT)
Dept: ORTHOPEDICS | Facility: CLINIC | Age: 45
End: 2025-03-13
Payer: COMMERCIAL

## 2025-03-13 ENCOUNTER — HOSPITAL ENCOUNTER (OUTPATIENT)
Dept: RADIOLOGY | Facility: HOSPITAL | Age: 45
Discharge: HOME | End: 2025-03-13
Attending: ORTHOPAEDIC SURGERY
Payer: COMMERCIAL

## 2025-03-13 VITALS — BODY MASS INDEX: 20.83 KG/M2 | HEIGHT: 65 IN | WEIGHT: 125 LBS

## 2025-03-13 DIAGNOSIS — R52 PAIN: ICD-10-CM

## 2025-03-13 DIAGNOSIS — M79.672 LEFT FOOT PAIN: Primary | ICD-10-CM

## 2025-03-13 DIAGNOSIS — Z98.890 S/P HARDWARE REMOVAL: ICD-10-CM

## 2025-03-13 PROCEDURE — 73630 X-RAY EXAM OF FOOT: CPT | Mod: TC,LT

## 2025-03-13 PROCEDURE — 73630 X-RAY EXAM OF FOOT: CPT | Mod: 26,LT,, | Performed by: RADIOLOGY

## 2025-03-13 PROCEDURE — 99999 PR PBB SHADOW E&M-EST. PATIENT-LVL III: CPT | Mod: PBBFAC,,, | Performed by: ORTHOPAEDIC SURGERY

## 2025-03-13 PROCEDURE — 1160F RVW MEDS BY RX/DR IN RCRD: CPT | Mod: CPTII,S$GLB,, | Performed by: ORTHOPAEDIC SURGERY

## 2025-03-13 PROCEDURE — 99024 POSTOP FOLLOW-UP VISIT: CPT | Mod: S$GLB,,, | Performed by: ORTHOPAEDIC SURGERY

## 2025-03-13 PROCEDURE — 1159F MED LIST DOCD IN RCRD: CPT | Mod: CPTII,S$GLB,, | Performed by: ORTHOPAEDIC SURGERY

## 2025-03-13 RX ORDER — GABAPENTIN 300 MG/1
300 CAPSULE ORAL 3 TIMES DAILY
Qty: 60 CAPSULE | Refills: 11 | Status: SHIPPED | OUTPATIENT
Start: 2025-03-13 | End: 2026-03-13

## 2025-03-13 NOTE — PROGRESS NOTES
Esdras Solorio  Returns today for follow-up.  She is now about six weeks postop from hardware removal from both big toes.  She had screws from bunion surgery that were placed about 23 years ago.  I was able to remove the screw completely from the right foot but on the left side the head of the screw broke off and I elected not to luly the remainder of the screw which was imbedded in the bone.  She returns today and reports that her right foot is doing well but on the left side she is having some continued intermittent shooting type pains which will last for a period of time.  She does not report any significant swelling.      Examination:  The incisions on both feet are healing well.  She has some continued tenderness over the dorsum of the left big toe and the incision.  She has painless range of motion of the big toe MTP joint.    Imaging: I ordered and reviewed an x-ray of the left foot today.  The x-ray reveals some mild swelling around the surgical site with some metal particulate matter from the attempted screw removal.      Impression:   1. Left foot pain  X-Ray Foot Complete Left    gabapentin (NEURONTIN) 300 MG capsule      2. S/P hardware removal, screws both feet, 02/05/2025  gabapentin (NEURONTIN) 300 MG capsule              Recommendation:  At this point I would recommend giving this further time.  I encouraged her to progress her activities.  Hopefully these neurogenic symptoms will resolve.      Follow-up in six weeks

## 2025-03-18 ENCOUNTER — PATIENT MESSAGE (OUTPATIENT)
Dept: ORTHOPEDICS | Facility: CLINIC | Age: 45
End: 2025-03-18
Payer: COMMERCIAL

## 2025-04-17 ENCOUNTER — OFFICE VISIT (OUTPATIENT)
Dept: URBAN - METROPOLITAN AREA CLINIC 12 | Facility: CLINIC | Age: 45
End: 2025-04-17

## 2025-04-17 ENCOUNTER — OFFICE VISIT (OUTPATIENT)
Dept: URBAN - METROPOLITAN AREA SURGERY CENTER 16 | Facility: SURGERY CENTER | Age: 45
End: 2025-04-17
Payer: COMMERCIAL

## 2025-04-17 DIAGNOSIS — Z12.11 COLON CANCER SCREENING: ICD-10-CM

## 2025-04-17 PROCEDURE — 0528F RCMND FLW-UP 10 YRS DOCD: CPT | Performed by: INTERNAL MEDICINE

## 2025-04-17 PROCEDURE — G0121 COLON CA SCRN NOT HI RSK IND: HCPCS | Performed by: INTERNAL MEDICINE

## 2025-04-17 PROCEDURE — 00812 ANES LWR INTST SCR COLSC: CPT | Performed by: NURSE ANESTHETIST, CERTIFIED REGISTERED

## 2025-04-17 PROCEDURE — 00812 ANES LWR INTST SCR COLSC: CPT | Performed by: ANESTHESIOLOGY

## 2025-04-22 ENCOUNTER — OFFICE VISIT (OUTPATIENT)
Dept: ORTHOPEDICS | Facility: CLINIC | Age: 45
End: 2025-04-22
Payer: COMMERCIAL

## 2025-04-22 VITALS — WEIGHT: 125 LBS | HEIGHT: 65 IN | BODY MASS INDEX: 20.83 KG/M2

## 2025-04-22 DIAGNOSIS — Z09 FOLLOW-UP EXAMINATION AFTER ORTHOPEDIC SURGERY: Primary | ICD-10-CM

## 2025-04-22 PROCEDURE — 1160F RVW MEDS BY RX/DR IN RCRD: CPT | Mod: CPTII,S$GLB,, | Performed by: ORTHOPAEDIC SURGERY

## 2025-04-22 PROCEDURE — 99024 POSTOP FOLLOW-UP VISIT: CPT | Mod: S$GLB,,, | Performed by: ORTHOPAEDIC SURGERY

## 2025-04-22 PROCEDURE — 99999 PR PBB SHADOW E&M-EST. PATIENT-LVL III: CPT | Mod: PBBFAC,,, | Performed by: ORTHOPAEDIC SURGERY

## 2025-04-22 PROCEDURE — 1159F MED LIST DOCD IN RCRD: CPT | Mod: CPTII,S$GLB,, | Performed by: ORTHOPAEDIC SURGERY

## 2025-04-22 NOTE — PROGRESS NOTES
Esdras Solorio  Returns today for follow-up.  She is now about 10 weeks postop from bilateral 1st metatarsal screw removals.  She had previous bunion surgery in the past.  The screw in the right foot came out without difficulty but the screw in the left 1st metatarsal fractured when I attempted to remove it and I elected to leave the remainder of the screw in the bone as opposed to over drilling in creating a large defect to remove the screw.  When she returned to see me on 03/13/2025 she was having continued pain in the left foot both related to the bone as well as some neurogenic pain.  She returns today and reports that the bone pain appears to be resolve and the neurogenic pain is decreasing in frequency.  She has been using gabapentin which she feels does help.    Examination:  Both surgical sites are well healed and there is no significant swelling of either big toe.  There is no significant tenderness over the surgical sites of either foot at this time.  She has painless motion of her big toes.    Impression:  10 weeks postop hardware removal with retained screw left big toe.    Recommendation: It is encouraging that her symptoms are improving and I would hope that the neurogenic pain would resolve with time.  She can progress her activities as tolerated.  She is going to follow-up on an as-needed basis

## 2025-08-06 ENCOUNTER — HOSPITAL ENCOUNTER (OUTPATIENT)
Dept: RADIOLOGY | Facility: HOSPITAL | Age: 45
Discharge: HOME | End: 2025-08-06
Payer: COMMERCIAL

## 2025-08-06 DIAGNOSIS — R10.2 PELVIC AND PERINEAL PAIN: ICD-10-CM

## 2025-08-06 DIAGNOSIS — N80.9 ENDOMETRIOSIS, UNSPECIFIED: ICD-10-CM

## 2025-08-06 DIAGNOSIS — N83.201 UNSPECIFIED OVARIAN CYST, RIGHT SIDE: ICD-10-CM

## 2025-08-06 DIAGNOSIS — R10.9 UNSPECIFIED ABDOMINAL PAIN: ICD-10-CM

## 2025-08-06 PROCEDURE — 74177 CT ABD & PELVIS W/CONTRAST: CPT | Mod: 26,,, | Performed by: RADIOLOGY

## 2025-08-06 PROCEDURE — 74177 CT ABD & PELVIS W/CONTRAST: CPT | Mod: TC

## 2025-08-06 PROCEDURE — 25500020 PHARM REV CODE 255

## 2025-08-06 PROCEDURE — A9698 NON-RAD CONTRAST MATERIALNOC: HCPCS

## 2025-08-06 RX ADMIN — BARIUM SULFATE 450 ML: 20 SUSPENSION ORAL at 08:08

## 2025-08-06 RX ADMIN — IOHEXOL 75 ML: 350 INJECTION, SOLUTION INTRAVENOUS at 09:08

## 2025-08-19 DIAGNOSIS — F43.9 STRESS: Primary | ICD-10-CM

## (undated) DEVICE — BANDAGE MATRIX HK LOOP 4IN 5YD

## (undated) DEVICE — GAUZE CNFRM STRL 4INX4.1YD

## (undated) DEVICE — ELECTRODE REM PLYHSV RETURN 9

## (undated) DEVICE — DRAPE C-ARM ELAS CLIP 42X120IN

## (undated) DEVICE — SUT ETHILON 3-0 PS2 18 BLK

## (undated) DEVICE — SOL NACL IRR 1000ML BTL

## (undated) DEVICE — TOURNIQUET SB QC SP 18X4IN

## (undated) DEVICE — STOCKINETTE TUBULAR 2PL 6 X 4

## (undated) DEVICE — PENCIL ROCKER SWITCH 10FT CORD

## (undated) DEVICE — DRAPE T EXTRM SURG 121X128X90

## (undated) DEVICE — DRESSING XEROFORM NONADH 1X8IN

## (undated) DEVICE — SPONGE GAUZE 16PLY 4X4

## (undated) DEVICE — PAD CAST SPECIALIST STRL 4

## (undated) DEVICE — SUT VICRYL 0 27 CT-2

## (undated) DEVICE — COVER LIGHT HANDLE 80/CA

## (undated) DEVICE — GLOVE BIOGEL PI MICRO SZ 7.5

## (undated) DEVICE — COVER CAMERA OPERATING ROOM

## (undated) DEVICE — TRAY MINOR ORTHO OMC

## (undated) DEVICE — UNDERGLOVES BIOGEL PI SIZE 8

## (undated) DEVICE — DRAPE TOP 53X102IN

## (undated) DEVICE — SPONGE COTTON TRAY 4X4IN

## (undated) DEVICE — SHOE POST-OP VEL CLOS W/MD

## (undated) DEVICE — BANDAGE ESMARK ELASTIC ST 4X9